# Patient Record
Sex: FEMALE | Race: BLACK OR AFRICAN AMERICAN | NOT HISPANIC OR LATINO | Employment: FULL TIME | URBAN - NONMETROPOLITAN AREA
[De-identification: names, ages, dates, MRNs, and addresses within clinical notes are randomized per-mention and may not be internally consistent; named-entity substitution may affect disease eponyms.]

---

## 2017-01-04 ENCOUNTER — NUTRITION (OUTPATIENT)
Dept: BARIATRICS/WEIGHT MGMT | Facility: HOSPITAL | Age: 32
End: 2017-01-04

## 2017-01-04 VITALS
RESPIRATION RATE: 20 BRPM | BODY MASS INDEX: 45.99 KG/M2 | HEIGHT: 67 IN | TEMPERATURE: 99.2 F | WEIGHT: 293 LBS | SYSTOLIC BLOOD PRESSURE: 157 MMHG | DIASTOLIC BLOOD PRESSURE: 92 MMHG

## 2017-01-04 PROCEDURE — 97802 MEDICAL NUTRITION INDIV IN: CPT

## 2017-02-06 ENCOUNTER — NUTRITION (OUTPATIENT)
Dept: BARIATRICS/WEIGHT MGMT | Facility: HOSPITAL | Age: 32
End: 2017-02-06

## 2017-02-06 ENCOUNTER — APPOINTMENT (OUTPATIENT)
Dept: BARIATRICS/WEIGHT MGMT | Facility: HOSPITAL | Age: 32
End: 2017-02-06

## 2017-02-06 ENCOUNTER — OFFICE VISIT (OUTPATIENT)
Dept: BARIATRICS/WEIGHT MGMT | Facility: CLINIC | Age: 32
End: 2017-02-06

## 2017-02-06 VITALS
TEMPERATURE: 98.2 F | DIASTOLIC BLOOD PRESSURE: 108 MMHG | BODY MASS INDEX: 45.99 KG/M2 | WEIGHT: 293 LBS | RESPIRATION RATE: 20 BRPM | OXYGEN SATURATION: 99 % | HEART RATE: 78 BPM | SYSTOLIC BLOOD PRESSURE: 163 MMHG | HEIGHT: 67 IN

## 2017-02-06 DIAGNOSIS — I10 HYPERTENSION, UNCONTROLLED: ICD-10-CM

## 2017-02-06 DIAGNOSIS — R63.5 WEIGHT GAIN: ICD-10-CM

## 2017-02-06 DIAGNOSIS — R53.83 FATIGUE, UNSPECIFIED TYPE: ICD-10-CM

## 2017-02-06 DIAGNOSIS — E66.01 MORBID OBESITY DUE TO EXCESS CALORIES (HCC): Primary | ICD-10-CM

## 2017-02-06 PROCEDURE — 99214 OFFICE O/P EST MOD 30 MIN: CPT | Performed by: NURSE PRACTITIONER

## 2017-02-06 PROCEDURE — 97803 MED NUTRITION INDIV SUBSEQ: CPT

## 2017-02-06 NOTE — PROGRESS NOTES
"Subjective   Samantha Vazquez is a 31 y.o. female.     History of Present Illness Samantha Vazquez is a 31 y.o. year-old who has morbid obesity and is interested in having surgical weight loss. Patient has been overweight for \"as long as I can remember\". The most weight ever lost was 20 pounds from using: going through a stressful relationship break up.  Unsupervised diet attempts include: low fat, low carb, Slim Fast, Atkins, etc.. Supervised diet attempts include: none (never had money to do those). Patient has tried the following OTC or prescription medications for weight loss: \"all kinds\" including Hydroxcut, green coffee beans, etc.  Patient states she tends to eat because she is hungry and admits to being an emotional eater. Patient is limited in activities due to: knee pain. She saw the dietician last month. She has been going to the gym three times per week on the treadmill. She has been getting at least 64 ounces of water per day. She is getting 3 meals per day with protein at each.     She  has a past medical history of Back pain; Chest pain on exertion; Heartburn; Hypertension; Insomnia; Irregular heart beat; Knee pain; Migraines; Night sweats; No energy; Swollen ankles; Tired; Urine frequency; and Weight gain.  She  does not have a problem list on file.  She  has a past surgical history that includes  section, classic and Excision Mass Trunk.  Her family history includes Cancer in her maternal grandfather and mother; Diabetes in her maternal grandmother; Hypertension in her mother; No Known Problems in her brother.  She  reports that she has never smoked. She has never used smokeless tobacco. She reports that she does not drink alcohol or use illicit drugs.  Current Outpatient Prescriptions   Medication Sig Dispense Refill   • fluticasone (VERAMYST) 27.5 MCG/SPRAY nasal spray 2 sprays into each nostril Daily.       No current facility-administered medications for this visit.      She has No Known " Allergies.      The following portions of the patient's history were reviewed and updated as appropriate: allergies, current medications, past family history, past medical history, past social history, past surgical history and problem list.    Review of Systems   Constitutional: Negative for activity change and appetite change.   HENT: Negative.    Eyes: Negative.    Respiratory: Negative.  Negative for apnea, cough, chest tightness and shortness of breath.          says that she does NOT snore   Cardiovascular: Positive for palpitations and leg swelling. Negative for chest pain.        Hx of HTN (elevated today) not currently tx; has appt with PCP today   Gastrointestinal: Negative.  Negative for abdominal pain, anal bleeding, constipation, nausea and vomiting.   Endocrine: Negative.    Genitourinary: Negative.    Musculoskeletal: Positive for arthralgias. Negative for back pain.        Knee pain   Skin: Negative.    Allergic/Immunologic: Negative.    Neurological: Negative.  Negative for seizures and syncope.   Hematological: Negative.  Does not bruise/bleed easily.   Psychiatric/Behavioral: Negative.  Negative for dysphoric mood, self-injury and sleep disturbance.       Objective   Physical Exam   Constitutional: She is oriented to person, place, and time. Vital signs are normal. She appears well-developed and well-nourished. She is cooperative. No distress.   HENT:   Head: Normocephalic and atraumatic.   Nose: Nose normal.   Mouth/Throat: Oropharynx is clear and moist. No oropharyngeal exudate or tonsillar abscesses.   Eyes: Conjunctivae, EOM and lids are normal. Pupils are equal, round, and reactive to light. Right eye exhibits no discharge. Left eye exhibits no discharge.   Neck: Trachea normal. Neck supple. No JVD present. Carotid bruit is not present. No rigidity. No tracheal deviation present. No thyromegaly present.   Cardiovascular: Normal rate, regular rhythm, S1 normal, S2 normal and normal  heart sounds.    Pulmonary/Chest: Effort normal and breath sounds normal. No stridor. No respiratory distress. She has no wheezes. She has no rales.   Abdominal: Soft. Bowel sounds are normal. She exhibits no distension. There is no tenderness.   Obese; healed scars noted    Musculoskeletal: She exhibits no edema.        Right shoulder: She exhibits normal strength.   Lymphadenopathy:     She has no cervical adenopathy.   Neurological: She is alert and oriented to person, place, and time. She has normal strength. No cranial nerve deficit.   Skin: Skin is warm, dry and intact. No rash noted.   Psychiatric: She has a normal mood and affect. Her speech is normal and behavior is normal.   Alert and oriented x 3   Vitals reviewed.      Assessment/Plan   Samantha was seen today for obesity, follow-up, nutrition counseling and weight loss.    Diagnoses and all orders for this visit:    Morbid obesity due to excess calories  -     Bariatric Nutritional Counseling; Standing  -     CBC (No Diff)  -     Comprehensive Metabolic Panel  -     Folate  -     Iron  -     Magnesium  -     Phosphorus  -     TSH  -     Vitamin A  -     Vitamin B1, Whole Blood  -     Vitamin B12  -     Vitamin D 25 Hydroxy  -     Vitamin E  -     Vitamin K1  -     Zinc  -     Lipid Panel    Body mass index 60.0-69.9, adult  -     Bariatric Nutritional Counseling; Standing  -     CBC (No Diff)  -     Comprehensive Metabolic Panel  -     Folate  -     Iron  -     Magnesium  -     Phosphorus  -     TSH  -     Vitamin A  -     Vitamin B1, Whole Blood  -     Vitamin B12  -     Vitamin D 25 Hydroxy  -     Vitamin E  -     Vitamin K1  -     Zinc  -     Lipid Panel    Hypertension, uncontrolled  -     Bariatric Nutritional Counseling; Standing  -     CBC (No Diff)  -     Comprehensive Metabolic Panel  -     Folate  -     Iron  -     Magnesium  -     Phosphorus  -     TSH  -     Vitamin A  -     Vitamin B1, Whole Blood  -     Vitamin B12  -     Vitamin D 25  Hydroxy  -     Vitamin E  -     Vitamin K1  -     Zinc  -     Lipid Panel    Fatigue, unspecified type  -     CBC (No Diff)  -     Comprehensive Metabolic Panel  -     Folate  -     Iron  -     Magnesium  -     Phosphorus  -     TSH  -     Vitamin A  -     Vitamin B1, Whole Blood  -     Vitamin B12  -     Vitamin D 25 Hydroxy  -     Vitamin E  -     Vitamin K1  -     Zinc  -     Lipid Panel    Weight gain  -     CBC (No Diff)  -     Comprehensive Metabolic Panel  -     Folate  -     Iron  -     Magnesium  -     Phosphorus  -     TSH  -     Vitamin A  -     Vitamin B1, Whole Blood  -     Vitamin B12  -     Vitamin D 25 Hydroxy  -     Vitamin E  -     Vitamin K1  -     Zinc  -     Lipid Panel        Samantha Vazquez is a  31 y.o. who has morbid obesity with BMI of 62.68 and desires surgical weight loss. Patient has the following comorbidities: HTN and knee pain.  Patient has been advised that this surgery is considered to be elective major surgery that is typically done laparoscopically. Patient is a potentially good surgical candidate. Patient will need to meet with the Bariatric Surgeon to further discuss surgical options. Patient has been advised that they will need to have a work-up prior to surgery. This work-up will include an EGD to assess for H. Pylori and Suarez's esophagus. Additionally, patient will need to have a psychological evaluation and clearance prior to surgery. Patient will need the following additional clearances: cardiac. Get mammogram done (mother has hx of breast CA). Baseline lab work will be obtained today. Keep appt with PCP today to get B/P addressed. Patient will see the dietician today for make specific goals for diet, exercise, and lifestyle. Patient has received intensive behavioral therapy for obesity today. I will have them follow up in one month for a weight recheck and to further discuss options.

## 2017-02-06 NOTE — PATIENT INSTRUCTIONS
Samantha Vazquez is a  31 y.o. who has morbid obesity with BMI of 62.68 and desires surgical weight loss. Patient has the following comorbidities: HTN and knee pain.  Patient has been advised that this surgery is considered to be elective major surgery that is typically done laparoscopically. Patient is a potentially good surgical candidate. Patient will need to meet with the Bariatric Surgeon to further discuss surgical options. Patient has been advised that they will need to have a work-up prior to surgery. This work-up will include an EGD to assess for H. Pylori and Suarez's esophagus. Additionally, patient will need to have a psychological evaluation and clearance prior to surgery. Patient will need the following additional clearances: cardiac. Get mammogram done (mother has hx of breast CA). Baseline lab work will be obtained today. Keep appt with PCP today to get B/P addressed. Patient will see the dietician today for make specific goals for diet, exercise, and lifestyle. Patient has received intensive behavioral therapy for obesity today. I will have them follow up in one month for a weight recheck and to further discuss options.

## 2017-02-06 NOTE — PROGRESS NOTES
"Nutrition Bariatric/MWL Note     Visit   BA# 2    Anthropometrics   Height: 67\"  Weight: 400#  BMI: 62.7  Gained: 1#    Nutrition Recall  24 Hour recall:  (B) protein shake (L) fast food,Hi-C fruit drink (D) meat, vegetables, water  Eating 3 meals daily   Limited sweet intake  Large portions  Drinking with meals   Drinking greater to or equal to 64 fluid ounces  Replacing 1 meal with protein shake daily     Exercise   Walking on treadmill: 30 minutes 3 times per week    Habits:  None  Alcohol occasionally    Education    \"Scaling back: How to manage Your Portion Sizes and Reading Food Labels\"    Nutrition Goals   Continue diet changes  Eat 3-4meals per day with protein  Eat protein first at meals  Portion control / Use smaller plate or measuring cup   Increase fluid intake to 64 ounces per day    Exercise Goals  Increase to 30 minutes of walking, cycling, elliptical, swimming, chair, yoga or crossfit daily    Veronica Davidson RD, LD  02/06/2017  11:57 AM    "

## 2017-02-13 ENCOUNTER — APPOINTMENT (OUTPATIENT)
Dept: LAB | Facility: HOSPITAL | Age: 32
End: 2017-02-13

## 2017-02-13 DIAGNOSIS — E55.9 VITAMIN D DEFICIENCY: Primary | ICD-10-CM

## 2017-02-13 LAB
25(OH)D3 SERPL-MCNC: <12.8 NG/ML (ref 30–100)
ALBUMIN SERPL-MCNC: 3.8 G/DL (ref 3.5–5)
ALBUMIN/GLOB SERPL: 1.2 G/DL (ref 1.1–2.5)
ALP SERPL-CCNC: 73 U/L (ref 24–120)
ALT SERPL W P-5'-P-CCNC: 41 U/L (ref 0–54)
ANION GAP SERPL CALCULATED.3IONS-SCNC: 5 MMOL/L (ref 4–13)
ARTICHOKE IGE QN: 88 MG/DL (ref 0–99)
AST SERPL-CCNC: 20 U/L (ref 7–45)
BILIRUB SERPL-MCNC: 0.4 MG/DL (ref 0.1–1)
BUN BLD-MCNC: 14 MG/DL (ref 5–21)
BUN/CREAT SERPL: 22.6 (ref 7–25)
CALCIUM SPEC-SCNC: 9.2 MG/DL (ref 8.4–10.4)
CHLORIDE SERPL-SCNC: 104 MMOL/L (ref 98–110)
CHOLEST SERPL-MCNC: 142 MG/DL (ref 130–200)
CO2 SERPL-SCNC: 29 MMOL/L (ref 24–31)
CREAT BLD-MCNC: 0.62 MG/DL (ref 0.5–1.4)
DEPRECATED RDW RBC AUTO: 42 FL (ref 40–54)
ERYTHROCYTE [DISTWIDTH] IN BLOOD BY AUTOMATED COUNT: 13.8 % (ref 12–15)
FOLATE SERPL-MCNC: 6.93 NG/ML
GFR SERPL CREATININE-BSD FRML MDRD: 136 ML/MIN/1.73
GLOBULIN UR ELPH-MCNC: 3.3 GM/DL
GLUCOSE BLD-MCNC: 87 MG/DL (ref 70–100)
HCT VFR BLD AUTO: 36.3 % (ref 37–47)
HDLC SERPL-MCNC: 41 MG/DL
HGB BLD-MCNC: 12 G/DL (ref 12–16)
IRON 24H UR-MRATE: 48 MCG/DL (ref 42–180)
LDLC/HDLC SERPL: 2.26 {RATIO}
MAGNESIUM SERPL-MCNC: 1.8 MG/DL (ref 1.4–2.2)
MCH RBC QN AUTO: 27.8 PG (ref 28–32)
MCHC RBC AUTO-ENTMCNC: 33.1 G/DL (ref 33–36)
MCV RBC AUTO: 84 FL (ref 82–98)
PHOSPHATE SERPL-MCNC: 3.4 MG/DL (ref 2.5–4.5)
PLATELET # BLD AUTO: 248 10*3/MM3 (ref 130–400)
PMV BLD AUTO: 9.7 FL (ref 6–12)
POTASSIUM BLD-SCNC: 4 MMOL/L (ref 3.5–5.3)
PROT SERPL-MCNC: 7.1 G/DL (ref 6.3–8.7)
RBC # BLD AUTO: 4.32 10*6/MM3 (ref 4.2–5.4)
SODIUM BLD-SCNC: 138 MMOL/L (ref 135–145)
TRIGL SERPL-MCNC: 41 MG/DL (ref 0–149)
TSH SERPL DL<=0.05 MIU/L-ACNC: 2.03 MIU/ML (ref 0.47–4.68)
VIT B12 BLD-MCNC: 566 PG/ML (ref 239–931)
WBC NRBC COR # BLD: 3.8 10*3/MM3 (ref 4.8–10.8)

## 2017-02-13 PROCEDURE — 83540 ASSAY OF IRON: CPT | Performed by: NURSE PRACTITIONER

## 2017-02-13 PROCEDURE — 84446 ASSAY OF VITAMIN E: CPT | Performed by: NURSE PRACTITIONER

## 2017-02-13 PROCEDURE — 80050 GENERAL HEALTH PANEL: CPT | Performed by: NURSE PRACTITIONER

## 2017-02-13 PROCEDURE — 84630 ASSAY OF ZINC: CPT | Performed by: NURSE PRACTITIONER

## 2017-02-13 PROCEDURE — 36415 COLL VENOUS BLD VENIPUNCTURE: CPT | Performed by: NURSE PRACTITIONER

## 2017-02-13 PROCEDURE — 84590 ASSAY OF VITAMIN A: CPT | Performed by: NURSE PRACTITIONER

## 2017-02-13 PROCEDURE — 82607 VITAMIN B-12: CPT | Performed by: NURSE PRACTITIONER

## 2017-02-13 PROCEDURE — 82306 VITAMIN D 25 HYDROXY: CPT | Performed by: NURSE PRACTITIONER

## 2017-02-13 PROCEDURE — 84425 ASSAY OF VITAMIN B-1: CPT | Performed by: NURSE PRACTITIONER

## 2017-02-13 PROCEDURE — 84597 ASSAY OF VITAMIN K: CPT | Performed by: NURSE PRACTITIONER

## 2017-02-13 PROCEDURE — 83735 ASSAY OF MAGNESIUM: CPT | Performed by: NURSE PRACTITIONER

## 2017-02-13 PROCEDURE — 84100 ASSAY OF PHOSPHORUS: CPT | Performed by: NURSE PRACTITIONER

## 2017-02-13 PROCEDURE — 82746 ASSAY OF FOLIC ACID SERUM: CPT | Performed by: NURSE PRACTITIONER

## 2017-02-13 PROCEDURE — 80061 LIPID PANEL: CPT | Performed by: NURSE PRACTITIONER

## 2017-02-13 RX ORDER — ERGOCALCIFEROL 1.25 MG/1
50000 CAPSULE ORAL 2 TIMES WEEKLY
Qty: 8 CAPSULE | Refills: 3 | Status: SHIPPED | OUTPATIENT
Start: 2017-02-13 | End: 2017-06-15

## 2017-02-14 ENCOUNTER — TELEPHONE (OUTPATIENT)
Dept: BARIATRICS/WEIGHT MGMT | Facility: CLINIC | Age: 32
End: 2017-02-14

## 2017-02-14 NOTE — TELEPHONE ENCOUNTER
----- Message from VIRI Maloney sent at 2/13/2017 12:42 PM CST -----  White blood cell count is one point below normal. Please, send a copy of these lab results to her PCP, VIRI Valente for her review. She may want to repeat her WBC count in few weeks/months to see if it comes back to normal.   Additionally, her Vitamin D level is low at less than 12.8. I have sent script into her pharmacy at [facility].She is to take Vitamin D 50,000 units TWICE weekly for next 3 months. There are refills on this medication. I will recheck the level at that time.  Please, call and let patient know. Thanks!      Called and informed patient of lab results and directions for Vitamin D.  Jesi

## 2017-02-15 LAB
VIT B1 BLD-SCNC: 108.5 NMOL/L (ref 66.5–200)
ZINC SERPL-MCNC: 58 UG/DL (ref 56–134)

## 2017-02-16 LAB
A-TOCOPHEROL VIT E SERPL-MCNC: 7.1 MG/L (ref 5.3–16.8)
PHYTONADIONE SERPL-MCNC: 0.15 NG/ML (ref 0.13–1.88)
VIT A SERPL-MCNC: 22 UG/DL (ref 20–65)

## 2017-03-03 ENCOUNTER — RESULTS ENCOUNTER (OUTPATIENT)
Dept: BARIATRICS/WEIGHT MGMT | Facility: CLINIC | Age: 32
End: 2017-03-03

## 2017-03-03 DIAGNOSIS — E66.01 MORBID OBESITY DUE TO EXCESS CALORIES (HCC): ICD-10-CM

## 2017-03-03 DIAGNOSIS — I10 HYPERTENSION, UNCONTROLLED: ICD-10-CM

## 2017-03-08 ENCOUNTER — NUTRITION (OUTPATIENT)
Dept: BARIATRICS/WEIGHT MGMT | Facility: HOSPITAL | Age: 32
End: 2017-03-08

## 2017-03-08 ENCOUNTER — OFFICE VISIT (OUTPATIENT)
Dept: BARIATRICS/WEIGHT MGMT | Facility: CLINIC | Age: 32
End: 2017-03-08

## 2017-03-08 VITALS
OXYGEN SATURATION: 100 % | RESPIRATION RATE: 20 BRPM | HEIGHT: 67 IN | HEART RATE: 72 BPM | TEMPERATURE: 98.2 F | DIASTOLIC BLOOD PRESSURE: 82 MMHG | BODY MASS INDEX: 45.99 KG/M2 | SYSTOLIC BLOOD PRESSURE: 144 MMHG | WEIGHT: 293 LBS

## 2017-03-08 DIAGNOSIS — E66.01 MORBID OBESITY DUE TO EXCESS CALORIES (HCC): Primary | ICD-10-CM

## 2017-03-08 DIAGNOSIS — E55.9 VITAMIN D DEFICIENCY: ICD-10-CM

## 2017-03-08 PROCEDURE — 99213 OFFICE O/P EST LOW 20 MIN: CPT | Performed by: NURSE PRACTITIONER

## 2017-03-08 PROCEDURE — 97803 MED NUTRITION INDIV SUBSEQ: CPT

## 2017-03-08 NOTE — PROGRESS NOTES
"Nutrition Bariatric/MWL Note     Visit   BA# 3    Anthropometrics   Height: 67\"  Weight: 401#  BMI: 62.9  Gained: 1#    Nutrition Recall  24 Hour recall:  (B) protein shake (L) meat & vegetables or double cheeseburger, water or HiC (D) meat & vegetables, water, cranberry juice  Eating 3 meals daily   Meeting protein daily goal  Snacking in afternoon on chips or crackers  Making healthier choices  Limited sweet intake  Large portions  Drinking with meals   Drinking less than 64 fluid ounces  Replacing 1 meal with protein shake daily     Exercise   Gym: 3 times per week for 30-45 minutes    Habits:  None    Education    \"Lets Get Physical: Fitness 101\"    Nutrition Goals   Continue diet changes  Eat protein first at meals  Eliminate snacks  Portion control / Use smaller plate or measuring cup   Increase fluid intake to 64 ounces per day  Drink between meals only. Stop 30 minutes before and start 45 minutes after. Sip only with meals    Exercise Goals  Increase to 30 minutes of walking, cycling, elliptical, swimming, chair, yoga or crossfit daily    Veronica Davidson RD, LD  03/08/2017  12:07 PM    "

## 2017-03-08 NOTE — PROGRESS NOTES
Subjective   Samantha Vazquez is a 31 y.o. female.     History of Present Illness Samantha Vazquez is here with morbid obesity and desires to have surgery for weight loss. This is the patient's 3rd visit to the office.  Patient has not been cleared by psych as of yet. Patient has been exercising is going to the gym three times per week for 30-45 minutes.  Patient has been making health dietary choices and eating 3 meals per day with protein at each. Patient is drinking at least 64 ounces of water per day. Her Vitamin D level was very low at last visit and supplement was started. Will recheck that level in May. She needs to get her BMI down below 60 before seeing BA surgeon and discussing EGD and surgery options. She will need to have cardiac clearance prior to surgery. She has been advised to get mammogram updated soon. She did see her PCP regarding her HTN and they placed her on HCTZ.         The following portions of the patient's history were reviewed and updated as appropriate: allergies, current medications, past family history, past medical history, past social history, past surgical history and problem list.    Review of Systems   Constitutional: Negative for activity change, appetite change and fatigue.   HENT: Negative.    Eyes: Negative.    Respiratory: Negative.  Negative for apnea, cough, chest tightness and shortness of breath.    Cardiovascular: Negative.  Negative for chest pain, palpitations and leg swelling.   Gastrointestinal: Negative.  Negative for abdominal pain, anal bleeding, constipation, nausea and vomiting.   Endocrine: Negative.    Genitourinary: Negative.    Musculoskeletal: Negative.  Negative for arthralgias and back pain.   Skin: Negative.    Allergic/Immunologic: Negative.    Neurological: Negative.  Negative for seizures and syncope.   Hematological: Negative.  Does not bruise/bleed easily.   Psychiatric/Behavioral: Negative.  Negative for dysphoric mood, self-injury and sleep  disturbance.       Objective   Physical Exam   Constitutional: She is oriented to person, place, and time. Vital signs are normal. She appears well-developed and well-nourished. She is cooperative. No distress.   HENT:   Head: Normocephalic and atraumatic.   Nose: Nose normal.   Mouth/Throat: Oropharynx is clear and moist. No oropharyngeal exudate or tonsillar abscesses.   Eyes: Conjunctivae, EOM and lids are normal. Pupils are equal, round, and reactive to light. Right eye exhibits no discharge. Left eye exhibits no discharge.   Neck: Trachea normal. Neck supple. No JVD present. Carotid bruit is not present. No rigidity. No tracheal deviation present. No thyromegaly present.   Cardiovascular: Normal rate, regular rhythm, S1 normal, S2 normal and normal heart sounds.    Pulmonary/Chest: Effort normal and breath sounds normal. No stridor. No respiratory distress. She has no wheezes. She has no rales.   Abdominal: Soft. Bowel sounds are normal. She exhibits no distension. There is no tenderness.   obese   Musculoskeletal: She exhibits no edema.        Right shoulder: She exhibits normal strength.   Lymphadenopathy:     She has no cervical adenopathy.   Neurological: She is alert and oriented to person, place, and time. She has normal strength. No cranial nerve deficit.   Skin: Skin is warm, dry and intact. No rash noted.   Psychiatric: She has a normal mood and affect. Her speech is normal and behavior is normal.   Alert and oriented x 3   Vitals reviewed.      Assessment/Plan   Samantha was seen today for weight loss and obesity.    Diagnoses and all orders for this visit:    Morbid obesity due to excess calories    Body mass index 60.0-69.9, adult    Vitamin D deficiency          Samantha Vazquez has done okay this month with healthy changes. Patient has gained 1.6 pounds. Today we discussed healthy changes in lifestyle, diet, and exercise.   They will meet with the dietician today.   Intensive behavioral therapy  for obesity was done today.   Goals for this month are: 3 meals per day with protein at each; eat protein first, use smaller plate; exercise as advised per dietician.  Continue to take Vitamin D supplement. Will recheck level in May.  Will need to get BMI down to below 60 prior to meeting with BA surgeon and discussing EGD and surgery options.   Still needs psych clearance and cardiac clearance.  Get mammogram done.  Follow up in one month for a weight recheck.

## 2017-03-08 NOTE — PATIENT INSTRUCTIONS
Samantha Vazquez has done okay this month with healthy changes. Patient has gained 1.6 pounds. Today we discussed healthy changes in lifestyle, diet, and exercise.   They will meet with the dietician today.   Intensive behavioral therapy for obesity was done today.   Goals for this month are: 3 meals per day with protein at each; eat protein first, use smaller plate; exercise as advised per dietician.  Continue to take Vitamin D supplement. Will recheck level in May.  Will need to get BMI down to below 60 prior to meeting with BA surgeon and discussing EGD and surgery options.   Still needs psych clearance and cardiac clearance.  Get mammogram done.  Follow up in one month for a weight recheck.

## 2017-03-31 ENCOUNTER — RESULTS ENCOUNTER (OUTPATIENT)
Dept: BARIATRICS/WEIGHT MGMT | Facility: CLINIC | Age: 32
End: 2017-03-31

## 2017-03-31 DIAGNOSIS — I10 HYPERTENSION, UNCONTROLLED: ICD-10-CM

## 2017-03-31 DIAGNOSIS — E66.01 MORBID OBESITY DUE TO EXCESS CALORIES (HCC): ICD-10-CM

## 2017-04-05 ENCOUNTER — OFFICE VISIT (OUTPATIENT)
Dept: BARIATRICS/WEIGHT MGMT | Facility: CLINIC | Age: 32
End: 2017-04-05

## 2017-04-05 ENCOUNTER — NUTRITION (OUTPATIENT)
Dept: BARIATRICS/WEIGHT MGMT | Facility: HOSPITAL | Age: 32
End: 2017-04-05

## 2017-04-05 VITALS
HEART RATE: 77 BPM | HEIGHT: 67 IN | TEMPERATURE: 98.3 F | DIASTOLIC BLOOD PRESSURE: 75 MMHG | RESPIRATION RATE: 20 BRPM | SYSTOLIC BLOOD PRESSURE: 150 MMHG | WEIGHT: 293 LBS | BODY MASS INDEX: 45.99 KG/M2

## 2017-04-05 DIAGNOSIS — E55.9 VITAMIN D DEFICIENCY: ICD-10-CM

## 2017-04-05 DIAGNOSIS — E66.01 MORBID OBESITY DUE TO EXCESS CALORIES (HCC): Primary | ICD-10-CM

## 2017-04-05 PROCEDURE — 97803 MED NUTRITION INDIV SUBSEQ: CPT

## 2017-04-05 PROCEDURE — 99213 OFFICE O/P EST LOW 20 MIN: CPT | Performed by: NURSE PRACTITIONER

## 2017-04-05 NOTE — PATIENT INSTRUCTIONS
Samantha Vazquez has done well this month with healthy changes. Patient has lost 5.6 pounds. Today we discussed healthy changes in lifestyle, diet, and exercise.   They will meet with the dietician today.  Intensive behavioral therapy for obesity was done today.   Goals for this month are: 3 meals per day with protein at each; eat protein first, use smaller plate; exercise as advised.  You may download the [employer] babita to your smart phone and see if that is helpful to track protein intake, exercise, etc.   Will recheck Vitamin D level in May.  Need to get BMI below 60 in order to have EGD and cardiac clearance and meet with bariatric surgeon. Need to get weight at or below 381 pounds.   Keep psych appt for later this month.   Get mammogram done in next couple of months (mother history of breast cancer).   Follow up in one month for a weight recheck.

## 2017-04-05 NOTE — PROGRESS NOTES
Subjective   Samantha Vazquez is a 31 y.o. female.     History of Present Illness Samantha Vazquez is here with morbid obesity and desires to have surgery for weight loss. This is the patient's 4th visit to the office. Patient has not been cleared by psych as of yet. She has an appt for later this month.  Patient has been exercising is going to the gym every day for 30-45 minutes.  She has been walking, doing squats, and cardio. Patient has been making health dietary choices and eating 3 meals per day with protein at each.  Patient is drinking at least 80 ounces of water per day. Her Vitamin D level was very low two months ago and supplement was started. Will recheck that level in May. She needs to get her BMI down below 60 before seeing BA surgeon and discussing EGD and surgery options. She will need to have cardiac clearance prior to surgery. She has been advised to get mammogram updated soon. She has appt for that in May.        The following portions of the patient's history were reviewed and updated as appropriate: allergies, current medications, past family history, past medical history, past social history, past surgical history and problem list.    Review of Systems   Constitutional: Negative for activity change, appetite change and fatigue.   HENT: Negative.    Eyes: Negative.    Respiratory: Negative.  Negative for apnea, cough, chest tightness and shortness of breath.    Cardiovascular: Negative.  Negative for chest pain, palpitations and leg swelling.   Gastrointestinal: Negative.  Negative for abdominal pain, anal bleeding, constipation, nausea and vomiting.   Endocrine:        Night sweats    Genitourinary: Negative.    Musculoskeletal: Positive for arthralgias. Negative for back pain.        Knee pain    Skin: Negative.    Allergic/Immunologic: Negative.    Neurological: Negative.  Negative for seizures and syncope.   Hematological: Negative.  Does not bruise/bleed easily.   Psychiatric/Behavioral:  Negative.  Negative for dysphoric mood, self-injury and sleep disturbance.       Objective   Physical Exam   Constitutional: She is oriented to person, place, and time. Vital signs are normal. She appears well-developed and well-nourished. She is cooperative. No distress.   HENT:   Head: Normocephalic and atraumatic.   Nose: Nose normal.   Mouth/Throat: Oropharynx is clear and moist. No oropharyngeal exudate or tonsillar abscesses.   Eyes: Conjunctivae, EOM and lids are normal. Pupils are equal, round, and reactive to light. Right eye exhibits no discharge. Left eye exhibits no discharge.   Neck: Trachea normal. Neck supple. No JVD present. Carotid bruit is not present. No rigidity. No tracheal deviation present. No thyromegaly present.   Cardiovascular: Normal rate, regular rhythm, S1 normal, S2 normal and normal heart sounds.    Pulmonary/Chest: Effort normal and breath sounds normal. No stridor. No respiratory distress. She has no wheezes. She has no rales.   Abdominal: Soft. Bowel sounds are normal. She exhibits no distension. There is no tenderness.   obese   Musculoskeletal: She exhibits no edema.        Right shoulder: She exhibits normal strength.   Lymphadenopathy:     She has no cervical adenopathy.   Neurological: She is alert and oriented to person, place, and time. She has normal strength. No cranial nerve deficit.   Skin: Skin is warm, dry and intact. No rash noted.   Psychiatric: She has a normal mood and affect. Her speech is normal and behavior is normal.   Alert and oriented x 3   Vitals reviewed.      Assessment/Plan   Samantha was seen today for weight loss and obesity.    Diagnoses and all orders for this visit:    Morbid obesity due to excess calories    Body mass index 60.0-69.9, adult    Vitamin D deficiency          Samantha Vazquez has done well this month with healthy changes. Patient has lost 5.6 pounds. Today we discussed healthy changes in lifestyle, diet, and exercise.   They will meet  with the dietician today.  Intensive behavioral therapy for obesity was done today.   Goals for this month are: 3 meals per day with protein at each; eat protein first, use smaller plate; exercise as advised.  You may download the [employer] babita to your smart phone and see if that is helpful to track protein intake, exercise, etc.   Will recheck Vitamin D level in May.  Need to get BMI below 60 in order to have EGD and cardiac clearance and meet with bariatric surgeon. Need to get weight at or below 381 pounds.   Keep psych appt for later this month.   Get mammogram done in next couple of months (mother history of breast cancer).   Follow up in one month for a weight recheck.

## 2017-04-28 ENCOUNTER — RESULTS ENCOUNTER (OUTPATIENT)
Dept: BARIATRICS/WEIGHT MGMT | Facility: CLINIC | Age: 32
End: 2017-04-28

## 2017-04-28 DIAGNOSIS — I10 HYPERTENSION, UNCONTROLLED: ICD-10-CM

## 2017-04-28 DIAGNOSIS — E66.01 MORBID OBESITY DUE TO EXCESS CALORIES (HCC): ICD-10-CM

## 2017-05-24 ENCOUNTER — APPOINTMENT (OUTPATIENT)
Dept: LAB | Facility: HOSPITAL | Age: 32
End: 2017-05-24

## 2017-05-24 ENCOUNTER — OFFICE VISIT (OUTPATIENT)
Dept: BARIATRICS/WEIGHT MGMT | Facility: CLINIC | Age: 32
End: 2017-05-24

## 2017-05-24 VITALS
BODY MASS INDEX: 45.99 KG/M2 | DIASTOLIC BLOOD PRESSURE: 97 MMHG | HEIGHT: 67 IN | OXYGEN SATURATION: 100 % | SYSTOLIC BLOOD PRESSURE: 138 MMHG | WEIGHT: 293 LBS | HEART RATE: 73 BPM

## 2017-05-24 DIAGNOSIS — Z01.810 PREOP CARDIOVASCULAR EXAM: ICD-10-CM

## 2017-05-24 DIAGNOSIS — E55.9 VITAMIN D DEFICIENCY: ICD-10-CM

## 2017-05-24 DIAGNOSIS — I10 HYPERTENSION, UNCONTROLLED: ICD-10-CM

## 2017-05-24 DIAGNOSIS — E66.01 MORBID OBESITY DUE TO EXCESS CALORIES (HCC): Primary | ICD-10-CM

## 2017-05-24 LAB — 25(OH)D3 SERPL-MCNC: 33.3 NG/ML (ref 30–100)

## 2017-05-24 PROCEDURE — 36415 COLL VENOUS BLD VENIPUNCTURE: CPT | Performed by: NURSE PRACTITIONER

## 2017-05-24 PROCEDURE — 82306 VITAMIN D 25 HYDROXY: CPT | Performed by: NURSE PRACTITIONER

## 2017-05-24 PROCEDURE — 99213 OFFICE O/P EST LOW 20 MIN: CPT | Performed by: NURSE PRACTITIONER

## 2017-05-26 ENCOUNTER — RESULTS ENCOUNTER (OUTPATIENT)
Dept: BARIATRICS/WEIGHT MGMT | Facility: CLINIC | Age: 32
End: 2017-05-26

## 2017-05-26 DIAGNOSIS — I10 HYPERTENSION, UNCONTROLLED: ICD-10-CM

## 2017-05-26 DIAGNOSIS — E66.01 MORBID OBESITY DUE TO EXCESS CALORIES (HCC): ICD-10-CM

## 2017-06-15 ENCOUNTER — OFFICE VISIT (OUTPATIENT)
Dept: CARDIOLOGY | Facility: CLINIC | Age: 32
End: 2017-06-15

## 2017-06-15 VITALS
BODY MASS INDEX: 45.99 KG/M2 | WEIGHT: 293 LBS | DIASTOLIC BLOOD PRESSURE: 85 MMHG | HEIGHT: 67 IN | SYSTOLIC BLOOD PRESSURE: 150 MMHG | HEART RATE: 71 BPM

## 2017-06-15 DIAGNOSIS — I10 HYPERTENSION, UNCONTROLLED: Primary | ICD-10-CM

## 2017-06-15 DIAGNOSIS — Z01.810 PRE-OPERATIVE CARDIOVASCULAR EXAMINATION: ICD-10-CM

## 2017-06-15 DIAGNOSIS — E66.01 MORBID OBESITY DUE TO EXCESS CALORIES (HCC): ICD-10-CM

## 2017-06-15 PROCEDURE — 93000 ELECTROCARDIOGRAM COMPLETE: CPT | Performed by: INTERNAL MEDICINE

## 2017-06-15 PROCEDURE — 99214 OFFICE O/P EST MOD 30 MIN: CPT | Performed by: INTERNAL MEDICINE

## 2017-06-15 RX ORDER — HYDROCHLOROTHIAZIDE 25 MG/1
25 TABLET ORAL DAILY
Qty: 30 TABLET | Refills: 11 | Status: SHIPPED | OUTPATIENT
Start: 2017-06-15 | End: 2017-09-28 | Stop reason: HOSPADM

## 2017-06-15 NOTE — PROGRESS NOTES
Subjective:     Encounter Date: 06/15/2017      Patient ID: Samantha Vazquez is a 31 y.o. female.Who is referred here for preoperative evaluation prior to gastric sleeve surgery.    Referring Provider: VIRI Cortes    Reason for Referral: Pre-operative risk assessment    Chief Complaint: Preoperative evaluation    Hypertension   This is a chronic problem. The problem is unchanged. The problem is uncontrolled. Associated symptoms include palpitations (occasional) and shortness of breath (described as very mild). Pertinent negatives include no anxiety, blurred vision, chest pain, headaches, malaise/fatigue, neck pain, orthopnea, peripheral edema or PND. There are no associated agents to hypertension. Risk factors for coronary artery disease include obesity. Past treatments include diuretics. The current treatment provides moderate improvement. There are no compliance problems.  There is no history of angina, kidney disease, CAD/MI, CVA, heart failure, left ventricular hypertrophy, PVD or retinopathy. There is no history of chronic renal disease.     This is a 31-year-old  female with a history of hypertension who is referred for preoperative risk assessment prior to upcoming gastric sleeve surgery.  In talking with the patient, she has never had any form of cardiovascular disease diagnosed 3 she does not have diabetes.  She has never had a stroke.  She has never been diagnosed with congestive heart failure.  She says that in general, her exercise tolerance is quite good.  She would easily be undergo up at least one flight of steps without any significant problems.  She denies any chest pain.  She has very mild shortness of breath and dyspnea on exertion with what she describes as moderate activity.  She has occasional palpitations but no prolonged periods of palpitations.  She denies lightheadedness, dizziness, syncope, orthopnea, PND, edema.  Her blood pressure is generally somewhat elevated  on blood pressure readings at physician's offices however she does not routinely check her blood pressure at home.    The following portions of the patient's history were reviewed and updated as appropriate: allergies, current medications, past family history, past medical history, past social history, past surgical history and problem list.     Past Medical History:   Diagnosis Date   • Back pain    • Chest pain on exertion    • Heartburn    • Hypertension     does not currently have any medication   • Insomnia    • Irregular heart beat    • Knee pain    • Migraines    • Night sweats     onset a long time per patient    • No energy    • Swollen ankles     occasionally    • Tired    • Urine frequency     Urgency/Frequency   • Weight gain      Past Surgical History:   Procedure Laterality Date   •  SECTION      ,    • EXCISION MASS TRUNK      benign       Current Outpatient Prescriptions:   •  fluticasone (VERAMYST) 27.5 MCG/SPRAY nasal spray, 2 sprays into each nostril Daily., Disp: , Rfl:   •  Pediatric Multivitamins-Iron (MULTIPLE VITAMINS-IRON PO), Take 2 tablet/day by mouth Daily., Disp: , Rfl:   •  hydrochlorothiazide (HYDRODIURIL) 25 MG tablet, Take 1 tablet by mouth Daily., Disp: 30 tablet, Rfl: 11    No Known Allergies    Social History   Substance Use Topics   • Smoking status: Never Smoker   • Smokeless tobacco: Never Used   • Alcohol use No     Family History   Problem Relation Age of Onset   • Cancer Mother      Breast   • Hypertension Mother    • Cancer Maternal Grandfather    • No Known Problems Brother    • Diabetes Maternal Grandmother      Review of Systems   Constitution: Negative for chills, fever, weakness, malaise/fatigue, night sweats and weight loss.   HENT: Negative for congestion, headaches, hearing loss and sore throat.    Eyes: Negative for blurred vision and pain.   Cardiovascular: Positive for palpitations (occasional). Negative for chest pain, claudication, irregular  heartbeat, leg swelling, orthopnea, paroxysmal nocturnal dyspnea and syncope.   Respiratory: Positive for shortness of breath (described as very mild). Negative for cough, hemoptysis and wheezing.    Endocrine: Negative for cold intolerance, heat intolerance, polydipsia and polyuria.   Hematologic/Lymphatic: Negative for adenopathy and bleeding problem. Does not bruise/bleed easily.   Skin: Negative for color change, poor wound healing and rash.   Musculoskeletal: Negative for arthritis, back pain, joint pain, joint swelling, myalgias and neck pain.   Gastrointestinal: Negative for abdominal pain, change in bowel habit, constipation, diarrhea, heartburn, hematemesis, hematochezia, melena, nausea and vomiting.   Genitourinary: Negative for bladder incontinence, dysuria, frequency, hematuria and nocturia.   Neurological: Negative for dizziness, focal weakness, light-headedness, loss of balance, numbness, paresthesias and seizures.   Psychiatric/Behavioral: Negative for altered mental status, memory loss and substance abuse.   Allergic/Immunologic: Negative for HIV exposure, hives and persistent infections.       ECG 12 Lead  Date/Time: 6/15/2017 8:57 AM  Performed by: MICHELLE GONZALEZ  Authorized by: MICHELLE GONZALEZ   Previous ECG: no previous ECG available  Rhythm: sinus rhythm  Rate: normal  BPM: 71  Conduction: 1st degree  QRS axis: normal  Clinical impression: non-specific ECG             Objective:     Physical Exam   Constitutional: She is oriented to person, place, and time. Vital signs are normal. She appears well-developed and well-nourished. She is cooperative.  Non-toxic appearance. No distress.   HENT:   Head: Normocephalic and atraumatic.   Right Ear: External ear normal.   Left Ear: External ear normal.   Nose: Nose normal.   Mouth/Throat: Uvula is midline, oropharynx is clear and moist and mucous membranes are normal. Mucous membranes are not pale, not dry and not cyanotic. No oropharyngeal  "exudate.   Eyes: EOM and lids are normal. Pupils are equal, round, and reactive to light.   Neck: Normal range of motion. Neck supple. No hepatojugular reflux and no JVD present. Carotid bruit is not present. No tracheal deviation and no edema present. No thyroid mass and no thyromegaly present.   Cardiovascular: Normal rate, regular rhythm, S1 normal, S2 normal, normal heart sounds, intact distal pulses and normal pulses.   No extrasystoles are present. PMI is not displaced.  Exam reveals no gallop and no friction rub.    No murmur heard.  Pulses:       Radial pulses are 2+ on the right side, and 2+ on the left side.        Femoral pulses are 2+ on the right side, and 2+ on the left side.       Dorsalis pedis pulses are 2+ on the right side, and 2+ on the left side.        Posterior tibial pulses are 2+ on the right side, and 2+ on the left side.   Pulmonary/Chest: Effort normal and breath sounds normal. No accessory muscle usage. No respiratory distress. She has no wheezes. She has no rales. She exhibits no tenderness.   Abdominal: Soft. Normal appearance and bowel sounds are normal. She exhibits no distension, no abdominal bruit and no pulsatile midline mass. There is no hepatosplenomegaly. There is no tenderness.   Musculoskeletal: Normal range of motion. She exhibits no edema, tenderness or deformity.   Lymphadenopathy:     She has no cervical adenopathy.   Neurological: She is oriented to person, place, and time. She has normal strength. No cranial nerve deficit.   Skin: Skin is warm, dry and intact. No rash noted. She is not diaphoretic. No cyanosis or erythema. Nails show no clubbing.   Psychiatric: She has a normal mood and affect. Her speech is normal and behavior is normal. Thought content normal.   Vitals reviewed.    /85 (BP Location: Left arm, Patient Position: Sitting)  Pulse 71  Ht 67\" (170.2 cm)  Wt (!) 385 lb (175 kg)  BMI 60.3 kg/m2         Assessment:          Diagnosis Plan   1. " Hypertension, uncontrolled  hydrochlorothiazide (HYDRODIURIL) 25 MG tablet    ECG 12 Lead   2. Pre-operative cardiovascular examination     3. Morbid obesity due to excess calories          Plan:       1.  Preoperative cardiovascular examination: The patient is to undergo intermediate risk surgery, gastric sleeve surgery.  Based on the Revised Cardiac Risk Index, the patient has no clinical risk factors.  The patient's estimated functional capacity is greater than 4 metabolic equivalents.  At this level of activity, the patient does not have any significant symptoms.  Therefore, given this patient's risk profile and current clinical stability with good functional capacity, I do not feel that any further preoperative cardiovascular testing is indicated at this time, as any such testing would not necessarily lower this patient's operative risk.    2.  Essential hypertension: The patient's blood pressure is elevated today and prior readings indicate that her blood pressure typically runs close to this level.  She is on 12.5 mg of hydrochlorothiazide daily.  I will increase this to 25 mg and a vascular follow-up with her primary care physician regarding her blood pressure.    3.  Morbid obesity due to excess calories: The patient says that she is trying to be more physically active and encouraged her to continue to do so.  It seems that at this time she has a good functional capacity therefore I have encouraged her to be as active as possible in an effort to achieve a healthier lifestyle.  In addition, she will be following up in the bariatrics clinic.    Follow-up: As needed    EMR Dragon/Transcription disclaimer: Much of this encounter note is an electronic transcription/translation of spoken language to printed text. The electronic translation of spoken language may permit erroneous, or at times, nonsensical words or phrases to be inadvertently transcribed; although I have reviewed the note for such errors, some may  still exist.

## 2017-06-22 ENCOUNTER — PREP FOR SURGERY (OUTPATIENT)
Dept: OTHER | Facility: HOSPITAL | Age: 32
End: 2017-06-22

## 2017-06-22 ENCOUNTER — OFFICE VISIT (OUTPATIENT)
Dept: BARIATRICS/WEIGHT MGMT | Facility: CLINIC | Age: 32
End: 2017-06-22

## 2017-06-22 VITALS
OXYGEN SATURATION: 100 % | TEMPERATURE: 97.8 F | HEART RATE: 72 BPM | DIASTOLIC BLOOD PRESSURE: 86 MMHG | SYSTOLIC BLOOD PRESSURE: 133 MMHG | HEIGHT: 66 IN | BODY MASS INDEX: 47.09 KG/M2 | WEIGHT: 293 LBS

## 2017-06-22 DIAGNOSIS — I10 HYPERTENSION, UNCONTROLLED: ICD-10-CM

## 2017-06-22 DIAGNOSIS — E66.01 MORBID OBESITY DUE TO EXCESS CALORIES (HCC): ICD-10-CM

## 2017-06-22 PROCEDURE — 99213 OFFICE O/P EST LOW 20 MIN: CPT | Performed by: SURGERY

## 2017-06-22 NOTE — PROGRESS NOTES
Patient Care Team:  VIRI Valente as PCP - General (Family Medicine)  VIRI Maloney as Referring Physician (Family Medicine)  Juan M Garrison MD as Cardiologist (Cardiology)    Reason for Visit:  Surgical Weight loss    Subjective     Patient is a 31 y.o. female presents with morbid obesity and her Body mass index is 62.11 kg/(m^2)..     She is here for discussion of surgical weight loss options.  She stated she has been with the disease of obesity for year(s)(11).  She stated she suffers from , fatigue, knee and back pain due to her weight gain.  She stated that weight loss helps alleviate these symptoms.   She stated that she has tried multiple weight loss regimens including participating in a medically supervised weight loss program to help with weight loss.  She stated that she has attempted these conservative methods for weight loss without maintaining long term success.  Today she would like to discuss surgical weight loss options such as the Laparoscopic Sleeve Gastrectomy or the Laparoscopic R - Y Gastric Bypass.     Review of Systems  General ROS: positive for  - fatigue, sleep disturbance and weight gain  Ophthalmic ROS: negative  Respiratory ROS: no cough, shortness of breath, or wheezing  Cardiovascular ROS: no chest pain or dyspnea on exertion  Gastrointestinal ROS: no abdominal pain, change in bowel habits, or black or bloody stools  negative for - gas/bloating, heartburn or nausea/vomiting  Musculoskeletal ROS: positive for - joint pain and pain in back - lower  Neurological ROS: no TIA or stroke symptoms    History  Past Medical History:   Diagnosis Date   • Back pain    • Chest pain on exertion    • Heartburn    • Hypertension     does not currently have any medication   • Insomnia    • Irregular heart beat    • Knee pain    • Migraines    • Night sweats     onset a long time per patient    • No energy    • Swollen ankles     occasionally    • Tired    • Urine frequency      Urgency/Frequency   • Weight gain      Past Surgical History:   Procedure Laterality Date   •  SECTION      ,    • EXCISION MASS TRUNK      benign     Family History   Problem Relation Age of Onset   • Cancer Mother      Breast   • Hypertension Mother    • Cancer Maternal Grandfather    • No Known Problems Brother    • Diabetes Maternal Grandmother      Social History   Substance Use Topics   • Smoking status: Never Smoker   • Smokeless tobacco: Never Used   • Alcohol use No       (Not in a hospital admission)  Allergies:  Review of patient's allergies indicates no known allergies.    Objective     Vital Signs  Temp:  [97.8 °F (36.6 °C)] 97.8 °F (36.6 °C)  Heart Rate:  [72] 72  BP: (133)/(86) 133/86  Body mass index is 62.11 kg/(m^2).  Last 3 weights    17  0903   Weight: (!) 384 lb 12.8 oz (175 kg)       Physical Exam:     HEENT: extra ocular movement intact  Respiratory: appears well, vitals normal, no respiratory distress, acyanotic, normal RR, chest clear, no wheezing, crepitations, rhonchi, normal symmetric air entry  Cardiovascular: Regular rate and rhythm, S1, S2 normal, no murmur, click, rub or gallop  GI: Soft, non-tender, normal bowel sounds; no bruits, organomegaly or masses.  Abnormal shape: obese  Musculoskeletal: range of motion normal  Neurologic: alert, oriented, normal speech, no focal findings or movement disorder noted       Results Review:   None        Assessment/Plan   Encounter Diagnoses   Name Primary?   • Body mass index 60.0-69.9, adult Yes   • Morbid obesity due to excess calories    • Hypertension, uncontrolled        1.  I believe this patient will be a good candidate for weight loss surgery.  I have discussed the Viet - Y Gastric Bypass, laparoscopic sleeve gastrectomy and the Laparoscopic Gastric Band procedures.  We discussed the benefits of the surgeries including the benefit of weight loss and the possible reversal of co-morbid conditions associated with morbid  obesity. I explained to the patient that prior to making a definitive decision on the type of surgery she will require an esophagogastroduodenoscopy.  The alternatives  include not doing anything, or pursuing an UGI series which only offers a diagnosis with potential less accuracy compared to EGD. The benefits of the EGD such as identifying the pathology and anatomy of the upper GI system and the complications and risks of the procedure.  The risk of the endoscopy were discussed in detail. We discussed the risk of perforation (one out of 4838-6896, riskier with dilation), bleeding (one out of 500), and the rare risks of infection, adverse reaction to anesthesia, respiratory failure, cardiac failure including MI and adverse reaction to medications, etc. We discussed consequences that could occur if a risk were to develop such as the need for hospitalization, blood transfusion, surgical intervention, medications, pain, disability and death. The patient verbalizes understanding and agrees to proceed. such as bleeding, perforation, swallowing difficulties and gas bloat can occur after this procedure.  Upon completion of our discussion and addressing and answering her questions to her satisfation, informed consent was obtained.  She will be scheduled accordingly for the esophagogastroduodenoscopy procedure.  I also discussed with the patient that she will need to continue to lose weight prior to her procedure.  She was explained we require that her body mass index is below 60 prior to having her surgical weight loss procedure.  She will be started on a modified diet regimen today to help facilitate continued weight loss.  She will return in 1 month's time to assess her continued weight loss progress and scheduled for her EGD in approximately 6 weeks from.      I discussed the patients findings and my recommendations with patient.     Dr. Home Bahena MD [facility]    06/22/17  10:15 AM  Patient Care Team:  Renetta Cazares  APRN as PCP - General (Family Medicine)  VIRI Maloney as Referring Physician (Family Medicine)  Juan M Garrison MD as Cardiologist (Cardiology)

## 2017-06-23 ENCOUNTER — RESULTS ENCOUNTER (OUTPATIENT)
Dept: BARIATRICS/WEIGHT MGMT | Facility: CLINIC | Age: 32
End: 2017-06-23

## 2017-06-23 DIAGNOSIS — I10 HYPERTENSION, UNCONTROLLED: ICD-10-CM

## 2017-06-23 DIAGNOSIS — E66.01 MORBID OBESITY DUE TO EXCESS CALORIES (HCC): ICD-10-CM

## 2017-07-20 ENCOUNTER — OFFICE VISIT (OUTPATIENT)
Dept: BARIATRICS/WEIGHT MGMT | Facility: CLINIC | Age: 32
End: 2017-07-20

## 2017-07-20 VITALS
WEIGHT: 293 LBS | DIASTOLIC BLOOD PRESSURE: 83 MMHG | BODY MASS INDEX: 47.09 KG/M2 | OXYGEN SATURATION: 100 % | HEIGHT: 66 IN | SYSTOLIC BLOOD PRESSURE: 133 MMHG | HEART RATE: 68 BPM

## 2017-07-20 DIAGNOSIS — Z79.899 ENCOUNTER FOR LONG-TERM (CURRENT) USE OF MEDICATIONS: ICD-10-CM

## 2017-07-20 DIAGNOSIS — E66.01 MORBID OBESITY WITH BMI OF 60.0-69.9, ADULT (HCC): Primary | ICD-10-CM

## 2017-07-20 PROCEDURE — 99213 OFFICE O/P EST LOW 20 MIN: CPT | Performed by: NURSE PRACTITIONER

## 2017-07-20 NOTE — PROGRESS NOTES
"Subjective   Samantha Vazquez is a 32 y.o. female.     History of Present Illness   Samantha Vazquez is here with morbid obesity and desires to have surgery for weight loss. This is the patient's 7th visit to the office.  Patient has been cleared by psychologist and cardiologist.  Patient has been exercising by cardio and walking 4 times per week and for 45 minutes. Patient has been making health dietary choices and eating 4 meals per day with protein at each. Patient is unsure of how many protein grams she is getting per day. Patient is drinking 60-80 ounces of water per day. She has seen Dr. Bahena, but her BMI changed last month when she was re-measured to above 60 BMI. She needs to lose down to BMI of 60 or below prior to having EGD done on August 8th. She will be started on Metformin today to help facilitate weight loss. She has a normal BUN/Creat on file from February of this year.   Vitals:    07/20/17 0927   BP: 133/83   BP Location: Right arm   Patient Position: Sitting   Cuff Size: Adult   Pulse: 68   SpO2: 100%   Weight: (!) 380 lb 3.2 oz (172 kg)   Height: 66\" (167.6 cm)         The following portions of the patient's history were reviewed and updated as appropriate: allergies, current medications, past family history, past medical history, past social history, past surgical history and problem list.    Review of Systems   Constitutional: Negative for activity change, appetite change and fatigue.   HENT: Negative.    Eyes: Negative.    Respiratory: Negative.  Negative for apnea, cough, chest tightness and shortness of breath.    Cardiovascular: Negative.  Negative for chest pain, palpitations and leg swelling.   Gastrointestinal: Negative.  Negative for abdominal pain, anal bleeding, constipation, nausea and vomiting.   Endocrine: Negative.    Genitourinary: Negative.    Musculoskeletal: Negative.  Negative for arthralgias and back pain.   Skin: Negative.    Allergic/Immunologic: Negative.  "   Neurological: Negative.  Negative for seizures and syncope.   Hematological: Negative.  Does not bruise/bleed easily.   Psychiatric/Behavioral: Negative.  Negative for dysphoric mood, self-injury and sleep disturbance.       Objective   Physical Exam   Constitutional: She is oriented to person, place, and time. Vital signs are normal. She appears well-developed and well-nourished. She is cooperative. No distress.   HENT:   Head: Normocephalic and atraumatic.   Nose: Nose normal.   Mouth/Throat: Oropharynx is clear and moist. No oropharyngeal exudate or tonsillar abscesses.   Eyes: Conjunctivae, EOM and lids are normal. Pupils are equal, round, and reactive to light. Right eye exhibits no discharge. Left eye exhibits no discharge.   Neck: Trachea normal. Neck supple. No JVD present. Carotid bruit is not present. No rigidity. No tracheal deviation present. No thyromegaly present.   Cardiovascular: Normal rate, regular rhythm, S1 normal, S2 normal and normal heart sounds.    Pulmonary/Chest: Effort normal and breath sounds normal. No stridor. No respiratory distress. She has no wheezes. She has no rales.   Abdominal: Soft. Bowel sounds are normal. She exhibits no distension. There is no tenderness.   obese   Musculoskeletal: She exhibits no edema.        Right shoulder: She exhibits normal strength.   Lymphadenopathy:     She has no cervical adenopathy.   Neurological: She is alert and oriented to person, place, and time. She has normal strength. No cranial nerve deficit.   Skin: Skin is warm, dry and intact. No rash noted.   Psychiatric: She has a normal mood and affect. Her speech is normal and behavior is normal.   Alert and oriented x 3   Vitals reviewed.      Assessment/Plan   Samantha was seen today for follow-up, obesity, nutrition counseling and weight loss.    Diagnoses and all orders for this visit:    Morbid obesity with BMI of 60.0-69.9, adult    Encounter for long-term (current) use of  medications    Other orders  -     metFORMIN (GLUCOPHAGE) 500 MG tablet; Take 1 tablet by mouth 2 (Two) Times a Day With Meals.          Samantha Vazquez has done well this month with healthy changes. Patient has lost 4.6 pounds. Today we discussed healthy changes in lifestyle, diet, and exercise.   Extended liver shrinking diet (already has info).  Premier shake sample provided may use as meal replacement. Stretch band provided.  Intensive behavioral therapy for obesity was done today.   Goals for this month are: lose 7 pounds prior to EGD; eat protein first, use smaller plate; exercise as advised (swimming, launch pad videos, chair dancing, stretch bands).   Start Metformin to help facilitate weight loss. BUN and creat were normal in February.   Follow up on 8/7/17 for weight recheck prior to EGD on 8/8/17 with Dr. Bahena. BMI needs to be at 60 or below.

## 2017-07-20 NOTE — PATIENT INSTRUCTIONS
Samantha Vazquez has done well this month with healthy changes. Patient has lost 4.6 pounds. Today we discussed healthy changes in lifestyle, diet, and exercise.   Extended liver shrinking diet (already has info).  Premier shake sample provided may use as meal replacement. Stretch band provided.  Intensive behavioral therapy for obesity was done today.   Goals for this month are: lose 7 pounds prior to EGD; eat protein first, use smaller plate; exercise as advised (swimming, launch pad videos, chair dancing, stretch bands).   Start Metformin to help facilitate weight loss. BUN and creat were normal in February.   Follow up on 8/7/17 for weight recheck prior to EGD on 8/8/17 with Dr. Bahena. BMI needs to be at 60 or below.

## 2017-07-21 ENCOUNTER — RESULTS ENCOUNTER (OUTPATIENT)
Dept: BARIATRICS/WEIGHT MGMT | Facility: CLINIC | Age: 32
End: 2017-07-21

## 2017-07-21 DIAGNOSIS — E66.01 MORBID OBESITY DUE TO EXCESS CALORIES (HCC): ICD-10-CM

## 2017-07-21 DIAGNOSIS — I10 HYPERTENSION, UNCONTROLLED: ICD-10-CM

## 2017-07-31 ENCOUNTER — TREATMENT (OUTPATIENT)
Dept: BARIATRICS/WEIGHT MGMT | Facility: CLINIC | Age: 32
End: 2017-07-31

## 2017-07-31 VITALS — BODY MASS INDEX: 47.09 KG/M2 | HEIGHT: 66 IN | WEIGHT: 293 LBS

## 2017-08-07 ENCOUNTER — OFFICE VISIT (OUTPATIENT)
Dept: BARIATRICS/WEIGHT MGMT | Facility: CLINIC | Age: 32
End: 2017-08-07

## 2017-08-07 VITALS
OXYGEN SATURATION: 100 % | TEMPERATURE: 98.6 F | BODY MASS INDEX: 47.09 KG/M2 | DIASTOLIC BLOOD PRESSURE: 86 MMHG | HEIGHT: 66 IN | SYSTOLIC BLOOD PRESSURE: 151 MMHG | WEIGHT: 293 LBS | HEART RATE: 73 BPM

## 2017-08-07 PROCEDURE — 99212 OFFICE O/P EST SF 10 MIN: CPT | Performed by: NURSE PRACTITIONER

## 2017-08-07 NOTE — PROGRESS NOTES
"Subjective   Samantha Vazquez is a 32 y.o. female.     History of Present Illness Samantha Vazquez is here with morbid obesity and desires to have surgery for weight loss. This is the patient's 8th visit to the office.  Patient has been cleared by psychologist and cardiologist.  Patient has been exercising by cardio and walking 4 times per week and for 60 minutes. Patient has been making health dietary choices and eating 3-4 meals per day with protein at each. Patient is getting at least 20 grams of protein per day. Patient is drinking 74 ounces of water per day. She has seen Dr. Bahena, but her BMI changed last month when she was re-measured to above 60 BMI. She needs to lose down to BMI of 60 or below prior to having EGD done on August 8th. Today her BMI is 60.85 and Dr. Bahena has given the approval to proceed with EGD tomorrow. She was started on Metformin last visit to help facilitate weight loss. This medication seems to be helping and is being well tolerated.     Vitals:    08/07/17 1303   BP: 151/86   BP Location: Right arm   Patient Position: Sitting   Cuff Size: Adult   Pulse: 73   Temp: 98.6 °F (37 °C)   SpO2: 100%   Weight: (!) 377 lb (171 kg)   Height: 66\" (167.6 cm)       The following portions of the patient's history were reviewed and updated as appropriate: allergies, current medications, past family history, past medical history, past social history, past surgical history and problem list.    Review of Systems   Constitutional: Negative for activity change, appetite change and fatigue.   HENT: Negative.    Eyes: Negative.    Respiratory: Negative.  Negative for apnea, cough, chest tightness and shortness of breath.    Cardiovascular: Negative.  Negative for chest pain, palpitations and leg swelling.   Gastrointestinal: Negative.  Negative for abdominal pain, anal bleeding, constipation, nausea and vomiting.   Endocrine: Negative.    Genitourinary: Negative.    Musculoskeletal: Negative.  Negative for " arthralgias and back pain.   Skin: Negative.    Allergic/Immunologic: Negative.    Neurological: Negative.  Negative for seizures and syncope.   Hematological: Negative.  Does not bruise/bleed easily.   Psychiatric/Behavioral: Negative.  Negative for dysphoric mood, self-injury and sleep disturbance.       Objective   Physical Exam   Constitutional: She is oriented to person, place, and time. Vital signs are normal. She appears well-developed and well-nourished. She is cooperative. No distress.   HENT:   Head: Normocephalic and atraumatic.   Nose: Nose normal.   Mouth/Throat: Oropharynx is clear and moist. No oropharyngeal exudate or tonsillar abscesses.   Eyes: Conjunctivae, EOM and lids are normal. Pupils are equal, round, and reactive to light. Right eye exhibits no discharge. Left eye exhibits no discharge.   Neck: Trachea normal. Neck supple. No JVD present. Carotid bruit is not present. No rigidity. No tracheal deviation present. No thyromegaly present.   Cardiovascular: Normal rate, regular rhythm, S1 normal, S2 normal and normal heart sounds.    Pulmonary/Chest: Effort normal and breath sounds normal. No stridor. No respiratory distress. She has no wheezes. She has no rales.   Abdominal: Soft. Bowel sounds are normal. She exhibits no distension. There is no tenderness.   obese   Musculoskeletal: She exhibits no edema.        Right shoulder: She exhibits normal strength.   Lymphadenopathy:     She has no cervical adenopathy.   Neurological: She is alert and oriented to person, place, and time. She has normal strength. No cranial nerve deficit.   Skin: Skin is warm, dry and intact. No rash noted.   Psychiatric: She has a normal mood and affect. Her speech is normal and behavior is normal.   Alert and oriented x 3   Vitals reviewed.      Assessment/Plan   Samantha was seen today for follow-up, obesity and weight loss.    Diagnoses and all orders for this visit:    Body mass index 60.0-69.9, adult               Samantha Vazquez has done well this month with healthy changes. Patient has lost 3 pounds. Today we discussed healthy changes in lifestyle, diet, and exercise.   Intensive behavioral therapy for obesity was done today.   Goals for this month are: 3 meals per day with protein at each; eat protein first, use smaller plate; exercise as advised.  Continue taking Metformin as directed.  Keep EGD scheduled for tomorrow with Dr. Bahena.   Everything else has been completed per insurance requirements.  Follow up in two weeks for a weight recheck and if BMI less than 60 she can then see Dr. Bahena at his earliest appt for surgery submission.

## 2017-08-07 NOTE — PATIENT INSTRUCTIONS
Samantha Vazquez has done well this month with healthy changes. Patient has lost 3 pounds. Today we discussed healthy changes in lifestyle, diet, and exercise.   Intensive behavioral therapy for obesity was done today.   Goals for this month are: 3 meals per day with protein at each; eat protein first, use smaller plate; exercise as advised.  Continue taking Metformin as directed.  Keep EGD scheduled for tomorrow with Dr. Bahena.   Everything else has been completed per insurance requirements.  Follow up in two weeks for a weight recheck and if BMI less than 60 she can then see Dr. Bahena at his earliest appt for surgery submission.

## 2017-08-08 ENCOUNTER — HOSPITAL ENCOUNTER (OUTPATIENT)
Facility: HOSPITAL | Age: 32
Setting detail: HOSPITAL OUTPATIENT SURGERY
Discharge: HOME | End: 2017-08-08
Attending: SURGERY | Admitting: SURGERY

## 2017-08-08 ENCOUNTER — ANESTHESIA EVENT (OUTPATIENT)
Dept: GASTROENTEROLOGY | Facility: HOSPITAL | Age: 32
End: 2017-08-08

## 2017-08-08 ENCOUNTER — ANESTHESIA (OUTPATIENT)
Dept: GASTROENTEROLOGY | Facility: HOSPITAL | Age: 32
End: 2017-08-08

## 2017-08-08 VITALS
OXYGEN SATURATION: 100 % | BODY MASS INDEX: 48.82 KG/M2 | DIASTOLIC BLOOD PRESSURE: 72 MMHG | HEIGHT: 65 IN | SYSTOLIC BLOOD PRESSURE: 163 MMHG | WEIGHT: 293 LBS | HEART RATE: 67 BPM | RESPIRATION RATE: 18 BRPM | TEMPERATURE: 97.1 F

## 2017-08-08 PROBLEM — K44.9 HIATAL HERNIA: Status: ACTIVE | Noted: 2017-08-08

## 2017-08-08 LAB — B-HCG UR QL: NEGATIVE

## 2017-08-08 PROCEDURE — 87081 CULTURE SCREEN ONLY: CPT | Performed by: SURGERY

## 2017-08-08 PROCEDURE — 43239 EGD BIOPSY SINGLE/MULTIPLE: CPT | Performed by: SURGERY

## 2017-08-08 PROCEDURE — 81025 URINE PREGNANCY TEST: CPT | Performed by: SURGERY

## 2017-08-08 PROCEDURE — 25010000002 PROPOFOL 10 MG/ML EMULSION: Performed by: NURSE ANESTHETIST, CERTIFIED REGISTERED

## 2017-08-08 PROCEDURE — 88305 TISSUE EXAM BY PATHOLOGIST: CPT | Performed by: SURGERY

## 2017-08-08 RX ORDER — PANTOPRAZOLE SODIUM 40 MG/1
40 TABLET, DELAYED RELEASE ORAL DAILY
Qty: 30 TABLET | Refills: 1 | Status: SHIPPED | OUTPATIENT
Start: 2017-08-08

## 2017-08-08 RX ORDER — LIDOCAINE HYDROCHLORIDE 10 MG/ML
0.5 INJECTION, SOLUTION INFILTRATION; PERINEURAL ONCE AS NEEDED
Status: DISCONTINUED | OUTPATIENT
Start: 2017-08-08 | End: 2017-08-08 | Stop reason: SDUPTHER

## 2017-08-08 RX ORDER — SODIUM CHLORIDE 0.9 % (FLUSH) 0.9 %
3 SYRINGE (ML) INJECTION AS NEEDED
Status: DISCONTINUED | OUTPATIENT
Start: 2017-08-08 | End: 2017-08-08 | Stop reason: HOSPADM

## 2017-08-08 RX ORDER — SODIUM CHLORIDE 9 MG/ML
500 INJECTION, SOLUTION INTRAVENOUS CONTINUOUS PRN
Status: DISCONTINUED | OUTPATIENT
Start: 2017-08-08 | End: 2017-08-08 | Stop reason: HOSPADM

## 2017-08-08 RX ORDER — PROPOFOL 10 MG/ML
VIAL (ML) INTRAVENOUS AS NEEDED
Status: DISCONTINUED | OUTPATIENT
Start: 2017-08-08 | End: 2017-08-08 | Stop reason: SURG

## 2017-08-08 RX ORDER — LIDOCAINE HYDROCHLORIDE 20 MG/ML
INJECTION, SOLUTION INFILTRATION; PERINEURAL AS NEEDED
Status: DISCONTINUED | OUTPATIENT
Start: 2017-08-08 | End: 2017-08-08 | Stop reason: SURG

## 2017-08-08 RX ADMIN — LIDOCAINE HYDROCHLORIDE 100 MG: 20 INJECTION, SOLUTION INFILTRATION; PERINEURAL at 07:34

## 2017-08-08 RX ADMIN — LIDOCAINE HYDROCHLORIDE 100 MG: 20 INJECTION, SOLUTION INFILTRATION; PERINEURAL at 07:33

## 2017-08-08 RX ADMIN — SODIUM CHLORIDE 500 ML: 9 INJECTION, SOLUTION INTRAVENOUS at 07:14

## 2017-08-08 RX ADMIN — PROPOFOL 100 MG: 10 INJECTION, EMULSION INTRAVENOUS at 07:33

## 2017-08-08 RX ADMIN — PROPOFOL 100 MG: 10 INJECTION, EMULSION INTRAVENOUS at 07:34

## 2017-08-08 RX ADMIN — LIDOCAINE HYDROCHLORIDE 0.5 ML: 10 INJECTION, SOLUTION EPIDURAL; INFILTRATION; INTRACAUDAL; PERINEURAL at 07:14

## 2017-08-08 NOTE — ANESTHESIA PREPROCEDURE EVALUATION
Anesthesia Evaluation     Patient summary reviewed   no history of anesthetic complications:  NPO Solid Status: > 8 hours       Airway   Mallampati: III  TM distance: >3 FB  Neck ROM: full  Dental          Pulmonary    (-) asthma, sleep apnea, not a smoker  Cardiovascular   Exercise tolerance: good (4-7 METS)    (+) hypertension,   (-) pacemaker, past MI, angina, cardiac stents      Neuro/Psych  (-) seizures, CVA  GI/Hepatic/Renal/Endo    (+) morbid obesity,   (-) GERD, liver disease, no renal disease, diabetes    Musculoskeletal     Abdominal    Substance History      OB/GYN    (-)  Pregnant        Other                                        Anesthesia Plan    ASA 3     general     intravenous induction   Anesthetic plan and risks discussed with patient.

## 2017-08-08 NOTE — H&P
Patient Care Team:  VIRI Valente as PCP - General (Family Medicine)  VIRI Maloney as Referring Physician (Family Medicine)  Juan M Garrison MD as Cardiologist (Cardiology)    Reason for Visit:  Surgical Weight loss    Subjective     Patient is a 32 y.o. female presents with morbid obesity and her Body mass index is 62.9 kg/(m^2).  History of Present Illness Samantha Vazquez is here with morbid obesity and desires to have surgery for weight loss. This is the patient's 8th visit to the office.  Patient has been cleared by psychologist and cardiologist.  Patient has been exercising by cardio and walking 4 times per week and for 60 minutes. Patient has been making health dietary choices and eating 3-4 meals per day with protein at each. Patient is getting at least 20 grams of protein per day. Patient is drinking 74 ounces of water per day. She has seen Dr. Bahena, but her BMI changed last month when she was re-measured to above 60 BMI. She needs to lose down to BMI of 60 or below prior to having EGD done on August 8th. On 8/7/2017 her BMI was 60.85 and Dr. Bahena has given the approval to proceed with EGD tomorrow. She was started on Metformin last visit to help facilitate weight loss. This medication seems to be helping and is being well tolerated.       Review of Systems  General ROS: positive for  - weight gain  Respiratory ROS: no cough, shortness of breath, or wheezing  Cardiovascular ROS: no chest pain or dyspnea on exertion  Gastrointestinal ROS: no abdominal pain, change in bowel habits, or black or bloody stools  Neurological ROS: no TIA or stroke symptoms    History  Past Medical History:   Diagnosis Date   • Back pain    • Chest pain on exertion    • Heartburn    • Hypertension     does not currently have any medication   • Insomnia    • Irregular heart beat    • Knee pain    • Migraines    • Night sweats     onset a long time per patient    • No energy    • Swollen ankles      occasionally    • Tired    • Urine frequency     Urgency/Frequency   • Weight gain      Past Surgical History:   Procedure Laterality Date   •  SECTION      ,    • EXCISION MASS TRUNK      benign     Family History   Problem Relation Age of Onset   • Cancer Mother      Breast   • Hypertension Mother    • Cancer Maternal Grandfather    • No Known Problems Brother    • Diabetes Maternal Grandmother      Social History   Substance Use Topics   • Smoking status: Never Smoker   • Smokeless tobacco: Never Used   • Alcohol use No     Prescriptions Prior to Admission   Medication Sig Dispense Refill Last Dose   • fluticasone (VERAMYST) 27.5 MCG/SPRAY nasal spray 2 sprays into each nostril Daily.   Past Week at Unknown time   • metFORMIN (GLUCOPHAGE) 500 MG tablet Take 1 tablet by mouth 2 (Two) Times a Day With Meals. 60 tablet 1 2017 at 1800   • Pediatric Multivitamins-Iron (MULTIPLE VITAMINS-IRON PO) Take 2 tablet/day by mouth Daily.   2017 at 1000   • hydrochlorothiazide (HYDRODIURIL) 25 MG tablet Take 1 tablet by mouth Daily. 30 tablet 11 2017     Allergies:  Review of patient's allergies indicates no known allergies.    Objective     Vital Signs  Temp:  [97.1 °F (36.2 °C)-98.6 °F (37 °C)] 97.1 °F (36.2 °C)  Heart Rate:  [73-76] 76  Resp:  [20] 20  BP: (151-158)/(86-91) 158/91  Body mass index is 62.9 kg/(m^2).  Last 3 weights    17  0659   Weight: (!) 378 lb (171 kg)       Physical Exam:     HEENT: extra ocular movement intact  Respiratory: chest clear, no wheezing, crepitations, rhonchi, normal symmetric air entry  Cardiovascular: Regular rate and rhythm, S1, S2 normal, no murmur, click, rub or gallop  GI: Soft, non-tender, normal bowel sounds; no bruits, organomegaly or masses.  Abnormal shape: obese.     Results Review:   None        Assessment/Plan   Patient Active Problem List   Diagnosis   • Morbid obesity due to excess calories   • Body mass index 60.0-69.9, adult   •  Hypertension, uncontrolled   • Fatigue   • Weight gain   • Vitamin D deficiency   • Pre-operative cardiovascular examination     I believe this patient will be a good candidate for weight loss surgery.  I have discussed the Viet - Y Gastric Bypass, laparoscopic sleeve gastrectomy and the Laparoscopic Gastric Band procedures.  We discussed the benefits of the surgeries including the benefit of weight loss and the possible reversal of co-morbid conditions associated with morbid obesity. I explained to the patient that prior to making a definitive decision on the type of surgery she will require an esophagogastroduodenoscopy.  The alternatives  include not doing anything, or pursuing an UGI series which only offers a diagnosis with potential less accuracy compared to EGD. The benefits of the EGD such as identifying the pathology and anatomy of the upper GI system and the complications and risks of the procedure.  The risk of the endoscopy were discussed in detail. We discussed the risk of perforation (one out of 4977-8838, riskier with dilation), bleeding (one out of 500), and the rare risks of infection, adverse reaction to anesthesia, respiratory failure, cardiac failure including MI and adverse reaction to medications, etc. We discussed consequences that could occur if a risk were to develop such as the need for hospitalization, blood transfusion, surgical intervention, medications, pain, disability and death. The patient verbalizes understanding and agrees to proceed. such as bleeding, perforation, swallowing difficulties and gas bloat can occur after this procedure.  Upon completion of our discussion and addressing and answering her questions to her satisfation, informed consent was obtained.  She will be scheduled accordingly for the esophagogastroduodenoscopy procedure.  I discussed the patients findings and my recommendations with patient.     Dr. Home Bahena MD [facility]    08/08/17  7:13 AM  Patient Care  Team:  VIRI Valente as PCP - General (Family Medicine)  VIRI Maloney as Referring Physician (Family Medicine)  Juan M Garrison MD as Cardiologist (Cardiology)

## 2017-08-08 NOTE — ANESTHESIA POSTPROCEDURE EVALUATION
"Patient: Samantha Vazquez    Procedure Summary     Date Anesthesia Start Anesthesia Stop Room / Location    08/08/17 0729 0743  [facility] /  [facility]       Procedure Diagnosis Surgeon Provider    ESOPHAGOGASTRODUODENOSCOPY WITH ANESTHESIA (N/A Esophagus) BMI 60.0-69.9, adult; Gastritis; Hiatal hernia  (BMI 60.0-69.9, adult [Z68.44]) MD Sonido Birch CRNA          Anesthesia Type: general  Last vitals  BP   158/91 (08/08/17 0659)    Temp   97.1 °F (36.2 °C) (08/08/17 0659)    Pulse   76 (08/08/17 0659)   Resp   20 (08/08/17 0659)    SpO2   98 % (08/08/17 0659)      Post Anesthesia Care and Evaluation    Patient location during evaluation: PHASE II  Patient participation: complete - patient participated  Level of consciousness: awake  Pain score: 0  Pain management: adequate  Airway patency: patent  Anesthetic complications: No anesthetic complications  PONV Status: none  Cardiovascular status: acceptable and blood pressure returned to baseline  Respiratory status: acceptable  Hydration status: acceptable    Comments: Blood pressure 158/91, pulse 76, temperature 97.1 °F (36.2 °C), temperature source Temporal Artery , resp. rate 20, height 65\" (165.1 cm), weight (!) 378 lb (171 kg), last menstrual period 07/25/2017, SpO2 98 %.        "

## 2017-08-08 NOTE — PLAN OF CARE
Problem: Patient Care Overview (Adult)  Goal: Adult Individualization and Mutuality  Outcome: Ongoing (interventions implemented as appropriate)    08/08/17 0657   Individualization   Patient Specific Preferences none   Patient Specific Goals none   Patient Specific Interventions none   Mutuality/Individual Preferences   What Anxieties, Fears or Concerns Do You Have About Your Health or Care? none   What Questions Do You Have About Your Health or Care? none   What Information Would Help Us Give You More Personalized Care? none

## 2017-08-08 NOTE — PLAN OF CARE
Problem: Patient Care Overview (Adult)  Goal: Plan of Care Review  Outcome: Outcome(s) achieved Date Met:  08/08/17 08/08/17 0749   Coping/Psychosocial Response Interventions   Plan Of Care Reviewed With patient;spouse   Patient Care Overview   Progress progress toward functional goals as expected   Outcome Evaluation   Outcome Summary/Follow up Plan D/C CRITERIA MET

## 2017-08-08 NOTE — BRIEF OP NOTE
ESOPHAGOGASTRODUODENOSCOPY WITH ANESTHESIA  Procedure Note    Samantha Vazquez  8/8/2017    Pre-op Diagnosis:   BMI 60.0-69.9, adult [Z68.44]    Post-op Diagnosis:     Post-Op Diagnosis Codes:     * BMI 60.0-69.9, adult [Z68.44]     * Gastritis [K29.70]     * Hiatal hernia [K44.9]    Procedure/CPT® Codes:      Procedure(s):  ESOPHAGOGASTRODUODENOSCOPY WITH ANESTHESIA    Surgeon(s):  Home Bahena MD    Anesthesia: Monitor Anesthesia Care    Staff:   Endo Technician: Juliette Stevens  Endo Nurse: Mansi Rocha RN    Estimated Blood Loss: *No blood loss documented*  Urine Voided: * No values recorded between 8/8/2017  7:28 AM and 8/8/2017  7:42 AM *    Specimens:                  ID Type Source Tests Collected by Time Destination   1 : arvind Tissue Stomach UREASE FOR H PYLORI, 24 HR Home Bahena MD 8/8/2017 0739    A : bx @ ge[facility] Tissue [place] TISSUE EXAM Home Bahena MD 8/8/2017 0740            Findings: hiatal hernia simple small    Complications: none      Home Bahena MD     Date: 8/8/2017  Time: 7:53 AM

## 2017-08-08 NOTE — PLAN OF CARE
Problem: GI Endoscopy (Adult)  Goal: Signs and Symptoms of Listed Potential Problems Will be Absent or Manageable (GI Endoscopy)  Outcome: Ongoing (interventions implemented as appropriate)    Problem: Patient Care Overview (Adult)  Goal: Plan of Care Review  Outcome: Ongoing (interventions implemented as appropriate)    08/08/17 0740   Coping/Psychosocial Response Interventions   Plan Of Care Reviewed With patient   Patient Care Overview   Progress no change   Outcome Evaluation   Outcome Summary/Follow up Plan pt tolerated procedure well.

## 2017-08-08 NOTE — PLAN OF CARE
Problem: GI Endoscopy (Adult)  Goal: Signs and Symptoms of Listed Potential Problems Will be Absent or Manageable (GI Endoscopy)  Outcome: Outcome(s) achieved Date Met:  08/08/17

## 2017-08-09 LAB
CYTO UR: NORMAL
LAB AP CASE REPORT: NORMAL
LAB AP CLINICAL INFORMATION: NORMAL
Lab: NORMAL
PATH REPORT.FINAL DX SPEC: NORMAL
PATH REPORT.GROSS SPEC: NORMAL
UREASE TISS QL: NEGATIVE

## 2017-08-18 ENCOUNTER — RESULTS ENCOUNTER (OUTPATIENT)
Dept: BARIATRICS/WEIGHT MGMT | Facility: CLINIC | Age: 32
End: 2017-08-18

## 2017-08-18 DIAGNOSIS — E66.01 MORBID OBESITY DUE TO EXCESS CALORIES (HCC): ICD-10-CM

## 2017-08-18 DIAGNOSIS — I10 HYPERTENSION, UNCONTROLLED: ICD-10-CM

## 2017-08-21 ENCOUNTER — OFFICE VISIT (OUTPATIENT)
Dept: BARIATRICS/WEIGHT MGMT | Facility: CLINIC | Age: 32
End: 2017-08-21

## 2017-08-21 VITALS
HEIGHT: 66 IN | HEART RATE: 78 BPM | DIASTOLIC BLOOD PRESSURE: 86 MMHG | OXYGEN SATURATION: 99 % | SYSTOLIC BLOOD PRESSURE: 154 MMHG | BODY MASS INDEX: 47.09 KG/M2 | WEIGHT: 293 LBS

## 2017-08-21 DIAGNOSIS — E66.01 MORBID OBESITY WITH BMI OF 50.0-59.9, ADULT (HCC): Primary | ICD-10-CM

## 2017-08-21 PROCEDURE — 99212 OFFICE O/P EST SF 10 MIN: CPT | Performed by: NURSE PRACTITIONER

## 2017-08-21 NOTE — PROGRESS NOTES
"Subjective   Samantha Vazquez is a 32 y.o. female.     History of Present Illness   Samantha Vazquez is here with morbid obesity and desires to have surgery for weight loss. This is the patient's 9th visit to the office.  Patient has been cleared by psychologist and cardiologist. She had her EGD done with Dr. Bahena on 8/8/17 and path report was negative for Suarez's esophagus. She is still doing fine on the metformin that she was prescribed by this provider. She has met her BMI requirement of getting below 60 and is now ready to see Dr. Bahena for surgery submission. Patient has been exercising by doing cardio and stretching for 30 minutes daily.  Patient has been making health dietary choices and eating 3 meals per day with protein at each. She is getting 72 grams of protein per day. Patient is drinking 64 ounces of water per day.   Vitals:    08/21/17 1107   BP: 154/86   BP Location: Right arm   Patient Position: Sitting   Cuff Size: Adult   Pulse: 78   SpO2: 99%   Weight: (!) 370 lb 6.4 oz (168 kg)   Height: 66\" (167.6 cm)         The following portions of the patient's history were reviewed and updated as appropriate: allergies, current medications, past family history, past medical history, past social history, past surgical history and problem list.    Review of Systems   Constitutional: Negative for activity change, appetite change and fatigue.   HENT: Negative.    Eyes: Negative.    Respiratory: Negative.  Negative for apnea, cough, chest tightness and shortness of breath.    Cardiovascular: Negative.  Negative for chest pain, palpitations and leg swelling.   Gastrointestinal: Negative.  Negative for abdominal pain, anal bleeding, constipation, nausea and vomiting.   Endocrine: Negative.    Genitourinary: Negative.    Musculoskeletal: Negative.  Negative for arthralgias and back pain.   Skin: Negative.    Allergic/Immunologic: Negative.    Neurological: Negative.  Negative for seizures and syncope. "   Hematological: Negative.  Does not bruise/bleed easily.   Psychiatric/Behavioral: Negative.  Negative for dysphoric mood, self-injury and sleep disturbance.       Objective   Physical Exam   Constitutional: She is oriented to person, place, and time. Vital signs are normal. She appears well-developed and well-nourished. She is cooperative. No distress.   HENT:   Head: Normocephalic and atraumatic.   Nose: Nose normal.   Mouth/Throat: Oropharynx is clear and moist. No oropharyngeal exudate or tonsillar abscesses.   Eyes: Conjunctivae, EOM and lids are normal. Pupils are equal, round, and reactive to light. Right eye exhibits no discharge. Left eye exhibits no discharge.   Neck: Trachea normal. Neck supple. No JVD present. Carotid bruit is not present. No rigidity. No tracheal deviation present. No thyromegaly present.   Cardiovascular: Normal rate, regular rhythm, S1 normal, S2 normal and normal heart sounds.    Pulmonary/Chest: Effort normal and breath sounds normal. No stridor. No respiratory distress. She has no wheezes. She has no rales.   Abdominal: Soft. Bowel sounds are normal. She exhibits no distension. There is no tenderness.   obese   Musculoskeletal: She exhibits no edema.        Right shoulder: She exhibits normal strength.   Lymphadenopathy:     She has no cervical adenopathy.   Neurological: She is alert and oriented to person, place, and time. She has normal strength. No cranial nerve deficit.   Skin: Skin is warm, dry and intact. No rash noted.   Psychiatric: She has a normal mood and affect. Her speech is normal and behavior is normal.   Alert and oriented x 3   Vitals reviewed.      Assessment/Plan   Samantha was seen today for follow-up, obesity, nutrition counseling and weight loss.    Diagnoses and all orders for this visit:    Morbid obesity with BMI of 50.0-59.9, adult          Samantha Vazquez has done really well with healthy changes. Patient has lost 7 pounds.   Today we discussed healthy  changes in lifestyle, diet, and exercise.   Intensive behavioral therapy for obesity was done today.   Goals for this month are: 3 meals per day with protein at each; eat protein first, use smaller plate; exercise as advised.  Continue Metformin for now. Will need to stop 7 days before surgery.   Follow up with Dr. Bahena for first available appt for surgery submission as all requirements have been met.

## 2017-08-21 NOTE — PATIENT INSTRUCTIONS
Samantha Vazquez has done really well with healthy changes. Patient has lost 7 pounds.   Today we discussed healthy changes in lifestyle, diet, and exercise.   Intensive behavioral therapy for obesity was done today.   Goals for this month are: 3 meals per day with protein at each; eat protein first, use smaller plate; exercise as advised.  Continue Metformin for now. Will need to stop 7 days before surgery.   Follow up with Dr. Bahena for first available appt for surgery submission as all requirements have been met.

## 2017-08-29 ENCOUNTER — OFFICE VISIT (OUTPATIENT)
Dept: BARIATRICS/WEIGHT MGMT | Facility: CLINIC | Age: 32
End: 2017-08-29

## 2017-08-29 VITALS
DIASTOLIC BLOOD PRESSURE: 87 MMHG | SYSTOLIC BLOOD PRESSURE: 160 MMHG | HEART RATE: 74 BPM | OXYGEN SATURATION: 100 % | TEMPERATURE: 96.8 F | WEIGHT: 293 LBS | HEIGHT: 66 IN | BODY MASS INDEX: 47.09 KG/M2

## 2017-08-29 DIAGNOSIS — E66.9 SUPER OBESE: ICD-10-CM

## 2017-08-29 DIAGNOSIS — K44.9 HIATAL HERNIA: ICD-10-CM

## 2017-08-29 PROCEDURE — 99213 OFFICE O/P EST LOW 20 MIN: CPT | Performed by: SURGERY

## 2017-08-29 RX ORDER — SCOLOPAMINE TRANSDERMAL SYSTEM 1 MG/1
1 PATCH, EXTENDED RELEASE TRANSDERMAL ONCE
Status: CANCELLED | OUTPATIENT
Start: 2017-08-29 | End: 2017-08-29

## 2017-08-29 NOTE — PROGRESS NOTES
Patient Care Team:  VIRI Valente as PCP - General (Family Medicine)  VIRI Maloney as Referring Physician (Family Medicine)  Juan M Garrison MD as Cardiologist (Cardiology)    Reason for Visit:  Surgical Weight loss    Subjective     Patient is a 32 y.o. female presents with morbid obesity and her Body mass index is 59.17 kg/(m^2)..     She is here for discussion of surgical weight loss options.  She stated she has been with the disease of obesity for year(s).  She stated she suffers from hypertension and insulin resistance due to her weight gain.  She stated that weight loss helps alleviate these symptoms.   She stated that she has tried multiple diet regimens including, participating in a medically supervised weight loss program to help with weight loss.  She stated that she has attempted these conservative methods for weight loss without maintaining long term success.  Today she would like to discuss surgical weight loss options such as the Laparoscopic Sleeve Gastrectomy or the Laparoscopic R - Y Gastric Bypass.     Review of Systems  General ROS: negative  Psychological ROS: negative  Ophthalmic ROS: negative  Respiratory ROS: no cough, shortness of breath, or wheezing  Cardiovascular ROS: no chest pain or dyspnea on exertion  Gastrointestinal ROS: no abdominal pain, change in bowel habits, or black or bloody stools  Musculoskeletal ROS: negative  Neurological ROS: no TIA or stroke symptoms    History  Past Medical History:   Diagnosis Date   • Back pain    • Chest pain on exertion    • Heartburn    • Hypertension     does not currently have any medication   • Insomnia    • Irregular heart beat    • Knee pain    • Migraines    • Night sweats     onset a long time per patient    • No energy    • Swollen ankles     occasionally    • Tired    • Urine frequency     Urgency/Frequency   • Weight gain      Past Surgical History:   Procedure Laterality Date   •  SECTION      2008    • ENDOSCOPY N/A 8/8/2017    Procedure: ESOPHAGOGASTRODUODENOSCOPY WITH ANESTHESIA;  Surgeon: Home Bahena MD;  Location: [facility] ENDOSCOPY;  Service:    • EXCISION MASS TRUNK      benign     Family History   Problem Relation Age of Onset   • Cancer Mother      Breast   • Hypertension Mother    • Cancer Maternal Grandfather    • No Known Problems Brother    • Diabetes Maternal Grandmother      Social History   Substance Use Topics   • Smoking status: Never Smoker   • Smokeless tobacco: Never Used   • Alcohol use No       (Not in a hospital admission)  Allergies:  Review of patient's allergies indicates no known allergies.    Objective     Vital Signs  Temp:  [96.8 °F (36 °C)] 96.8 °F (36 °C)  Heart Rate:  [74] 74  BP: (160)/(87) 160/87  Body mass index is 59.17 kg/(m^2).  Last 3 weights    08/29/17  1318   Weight: (!) 366 lb 9.6 oz (166 kg)       Physical Exam:     HEENT: PERRLA and Thyroid without masses  Respiratory: appears well, vitals normal, no respiratory distress, acyanotic, normal RR, neck free of mass or lymphadenopathy, chest clear, no wheezing, crepitations, rhonchi, normal symmetric air entry  Cardiovascular: Regular rate and rhythm, S1, S2 normal, no murmur, click, rub or gallop  GI: Soft, non-tender, normal bowel sounds; no bruits, organomegaly or masses.  Abnormal shape: obese  Musculoskeletal: inspection - no abnormality  Neurologic: alert, oriented, normal speech, no focal findings or movement disorder noted       Results Review:   None        Assessment/Plan   Encounter Diagnoses   Name Primary?   • Body mass index (BMI) of 50-59.9 in adult Yes   • Super obese    • Hiatal hernia        1.  I believe this patient will be a good candidate for weight loss surgery.    She has chosen laparoscopic sleeve gastrectomy. I agree with this decision.  I have discussed the Viet - Y Gastric Bypass, laparoscopic sleeve gastrectomy and the Laparoscopic Gastric Band procedures to provide the alternatives which  includes non surgical weight loss options as well.  We discussed the benefits of the surgeries including the benefit of weight loss and the possible reversal of co-morbid conditions associated with morbid obesity.  We discussed the complications and risks which include the risk of perforation, leakage,bleeding, intra-abdominal organ injury, stenosis or ulcerations, the risk of deep vein thrombosis formation leading to possible pulmonary embolisms, the risk of death.  I explained to the patient the possibility that this procedure may not be performed laparoscopically and may require being converted to an opened procedure or aborted due to abnormal anatomy.  Also postoperatively there is a risk of increased GERD symptoms after this procedure.  Upon completion of our discussion and addressing and answering her questions to her satisfation, informed consent was obtained.     We also discussed the EGD findings of a small hiatal hernia.  I explained to the patient that this hiatal hernia defect will be repaired based off the findings intraoperatively.  As a result there could be an increased risks of esophageal, diaphragmatic or liver injuries.  There is a possibility that the hiatal hernia may recur as well.  She will be scheduled accordingly for a laparoscopic Sleeve gastrectomy procedure.      I discussed the patients findings and my recommendations with patient.     Dr. Home Bahena MD [facility]    08/29/17  2:21 PM  Patient Care Team:  VIRI Valente as PCP - General (Family Medicine)  VIRI Maloney as Referring Physician (Family Medicine)  Juan M Garrison MD as Cardiologist (Cardiology)

## 2017-09-08 ENCOUNTER — APPOINTMENT (OUTPATIENT)
Dept: LAB | Facility: HOSPITAL | Age: 32
End: 2017-09-08
Attending: SURGERY

## 2017-09-08 ENCOUNTER — TREATMENT (OUTPATIENT)
Dept: BARIATRICS/WEIGHT MGMT | Facility: CLINIC | Age: 32
End: 2017-09-08

## 2017-09-08 ENCOUNTER — APPOINTMENT (OUTPATIENT)
Dept: PREADMISSION TESTING | Facility: HOSPITAL | Age: 32
End: 2017-09-08

## 2017-09-08 VITALS — BODY MASS INDEX: 47.09 KG/M2 | WEIGHT: 293 LBS | HEIGHT: 66 IN

## 2017-09-08 LAB
ALBUMIN SERPL-MCNC: 4.2 G/DL (ref 3.5–5)
ALBUMIN/GLOB SERPL: 1.2 G/DL (ref 1.1–2.5)
ALP SERPL-CCNC: 74 U/L (ref 24–120)
ALT SERPL W P-5'-P-CCNC: 23 U/L (ref 0–54)
ANION GAP SERPL CALCULATED.3IONS-SCNC: 9 MMOL/L (ref 4–13)
APTT PPP: 34.2 SECONDS (ref 24.1–34.8)
AST SERPL-CCNC: 50 U/L (ref 7–45)
BASOPHILS # BLD AUTO: 0.01 10*3/MM3 (ref 0–0.2)
BASOPHILS NFR BLD AUTO: 0.2 % (ref 0–2)
BILIRUB SERPL-MCNC: 1 MG/DL (ref 0.1–1)
BUN BLD-MCNC: 12 MG/DL (ref 5–21)
BUN/CREAT SERPL: 21.8 (ref 7–25)
CALCIUM SPEC-SCNC: 9.2 MG/DL (ref 8.4–10.4)
CHLORIDE SERPL-SCNC: 107 MMOL/L (ref 98–110)
CO2 SERPL-SCNC: 22 MMOL/L (ref 24–31)
CREAT BLD-MCNC: 0.55 MG/DL (ref 0.5–1.4)
DEPRECATED RDW RBC AUTO: 42.8 FL (ref 40–54)
EOSINOPHIL # BLD AUTO: 0.02 10*3/MM3 (ref 0–0.7)
EOSINOPHIL NFR BLD AUTO: 0.4 % (ref 0–4)
ERYTHROCYTE [DISTWIDTH] IN BLOOD BY AUTOMATED COUNT: 13.9 % (ref 12–15)
GFR SERPL CREATININE-BSD FRML MDRD: >150 ML/MIN/1.73
GLOBULIN UR ELPH-MCNC: 3.5 GM/DL
GLUCOSE BLD-MCNC: 76 MG/DL (ref 70–100)
HCT VFR BLD AUTO: 38.6 % (ref 37–47)
HGB BLD-MCNC: 13 G/DL (ref 12–16)
IMM GRANULOCYTES # BLD: 0.01 10*3/MM3 (ref 0–0.03)
IMM GRANULOCYTES NFR BLD: 0.2 % (ref 0–5)
INR PPP: 0.96 (ref 0.91–1.09)
LYMPHOCYTES # BLD AUTO: 1.61 10*3/MM3 (ref 0.72–4.86)
LYMPHOCYTES NFR BLD AUTO: 31.9 % (ref 15–45)
MCH RBC QN AUTO: 28.3 PG (ref 28–32)
MCHC RBC AUTO-ENTMCNC: 33.7 G/DL (ref 33–36)
MCV RBC AUTO: 84.1 FL (ref 82–98)
MONOCYTES # BLD AUTO: 0.37 10*3/MM3 (ref 0.19–1.3)
MONOCYTES NFR BLD AUTO: 7.3 % (ref 4–12)
NEUTROPHILS # BLD AUTO: 3.03 10*3/MM3 (ref 1.87–8.4)
NEUTROPHILS NFR BLD AUTO: 60 % (ref 39–78)
NRBC BLD MANUAL-RTO: 0 /100 WBC (ref 0–0)
PLATELET # BLD AUTO: 276 10*3/MM3 (ref 130–400)
PMV BLD AUTO: 10.5 FL (ref 6–12)
POTASSIUM BLD-SCNC: 3.9 MMOL/L (ref 3.5–5.3)
PROT SERPL-MCNC: 7.7 G/DL (ref 6.3–8.7)
PROTHROMBIN TIME: 13.1 SECONDS (ref 11.9–14.6)
RBC # BLD AUTO: 4.59 10*6/MM3 (ref 4.2–5.4)
SODIUM BLD-SCNC: 138 MMOL/L (ref 135–145)
WBC NRBC COR # BLD: 5.05 10*3/MM3 (ref 4.8–10.8)

## 2017-09-08 PROCEDURE — 85610 PROTHROMBIN TIME: CPT | Performed by: SURGERY

## 2017-09-08 PROCEDURE — 36415 COLL VENOUS BLD VENIPUNCTURE: CPT | Performed by: SURGERY

## 2017-09-08 PROCEDURE — 85025 COMPLETE CBC W/AUTO DIFF WBC: CPT | Performed by: SURGERY

## 2017-09-08 PROCEDURE — 80053 COMPREHEN METABOLIC PANEL: CPT | Performed by: SURGERY

## 2017-09-08 PROCEDURE — 85730 THROMBOPLASTIN TIME PARTIAL: CPT | Performed by: SURGERY

## 2017-09-08 NOTE — PROGRESS NOTES
Date: 09-08-17    [place]   Information and Education Class for Pre and Post     Surgery Care, Diets and Daily Living.       Surgery Type:   Sleeve Gastrectomy Consent on File    Weight: 363.4lb      Diet Stages Form given including diet stages and surgery dates/times   Picture taken & Picture Consent on File  Weight Loss Surgery Patient Contract on File      Patient has signed/agreed with the Diet Stage & Medication discontinue sheet:       1) Medications have been review by CHANTELLE MEREDITH-there are no other  medications except for the following that this patient will need to discontinue prior to Bariatric Surgery.      2) Patient informed to stop NSAID and Metformin  one week prior to surgery.       3) Dr Bahena recommends that this patient take 2 extra strength Tylenol (1000mg) along with 8 ounces of regular Gatorade (no red or pink in color) the morning of surgery.      4) During Boot camp-Patient also met with Outpatient Surgery Staff regarding Their other medications & prep for surgery.      Patient also received [facility] Surgery Post 1 week  & 1 month follow up appointment.     [unfilled]  9:05 AM

## 2017-09-15 ENCOUNTER — RESULTS ENCOUNTER (OUTPATIENT)
Dept: BARIATRICS/WEIGHT MGMT | Facility: CLINIC | Age: 32
End: 2017-09-15

## 2017-09-15 DIAGNOSIS — E66.01 MORBID OBESITY DUE TO EXCESS CALORIES (HCC): ICD-10-CM

## 2017-09-15 DIAGNOSIS — I10 HYPERTENSION, UNCONTROLLED: ICD-10-CM

## 2017-09-27 ENCOUNTER — ANESTHESIA (OUTPATIENT)
Dept: PERIOP | Facility: HOSPITAL | Age: 32
End: 2017-09-27

## 2017-09-27 ENCOUNTER — ANESTHESIA EVENT (OUTPATIENT)
Dept: PERIOP | Facility: HOSPITAL | Age: 32
End: 2017-09-27

## 2017-09-27 ENCOUNTER — HOSPITAL ENCOUNTER (INPATIENT)
Facility: HOSPITAL | Age: 32
LOS: 1 days | Discharge: HOME | End: 2017-09-28
Attending: SURGERY | Admitting: SURGERY

## 2017-09-27 DIAGNOSIS — E66.9 SUPER OBESE: ICD-10-CM

## 2017-09-27 DIAGNOSIS — Z98.84 STATUS POST LAPAROSCOPIC SLEEVE GASTRECTOMY: Primary | ICD-10-CM

## 2017-09-27 LAB
ABO GROUP BLD: NORMAL
B-HCG UR QL: NEGATIVE
BLD GP AB SCN SERPL QL: NEGATIVE
RH BLD: POSITIVE

## 2017-09-27 PROCEDURE — 94799 UNLISTED PULMONARY SVC/PX: CPT

## 2017-09-27 PROCEDURE — 86901 BLOOD TYPING SEROLOGIC RH(D): CPT | Performed by: SURGERY

## 2017-09-27 PROCEDURE — 0DB64Z3 EXCISION OF STOMACH, PERCUTANEOUS ENDOSCOPIC APPROACH, VERTICAL: ICD-10-PCS | Performed by: SURGERY

## 2017-09-27 PROCEDURE — 25010000002 METOCLOPRAMIDE PER 10 MG: Performed by: SURGERY

## 2017-09-27 PROCEDURE — 88307 TISSUE EXAM BY PATHOLOGIST: CPT | Performed by: SURGERY

## 2017-09-27 PROCEDURE — 81025 URINE PREGNANCY TEST: CPT | Performed by: SURGERY

## 2017-09-27 PROCEDURE — 25010000002 MIDAZOLAM PER 1 MG: Performed by: ANESTHESIOLOGY

## 2017-09-27 PROCEDURE — 25010000002 PROPOFOL 10 MG/ML EMULSION: Performed by: NURSE ANESTHETIST, CERTIFIED REGISTERED

## 2017-09-27 PROCEDURE — 86900 BLOOD TYPING SEROLOGIC ABO: CPT | Performed by: SURGERY

## 2017-09-27 PROCEDURE — 25010000002 ONDANSETRON PER 1 MG: Performed by: NURSE ANESTHETIST, CERTIFIED REGISTERED

## 2017-09-27 PROCEDURE — 25010000002 ONDANSETRON PER 1 MG: Performed by: SURGERY

## 2017-09-27 PROCEDURE — 25010000002 HYDROMORPHONE PER 4 MG: Performed by: ANESTHESIOLOGY

## 2017-09-27 PROCEDURE — 36415 COLL VENOUS BLD VENIPUNCTURE: CPT | Performed by: SURGERY

## 2017-09-27 PROCEDURE — 43775 LAP SLEEVE GASTRECTOMY: CPT | Performed by: SURGERY

## 2017-09-27 PROCEDURE — 25010000002 DEXAMETHASONE PER 1 MG: Performed by: ANESTHESIOLOGY

## 2017-09-27 PROCEDURE — 25010000003 CEFAZOLIN PER 500 MG: Performed by: SURGERY

## 2017-09-27 PROCEDURE — 25010000002 SUCCINYLCHOLINE PER 20 MG: Performed by: NURSE ANESTHETIST, CERTIFIED REGISTERED

## 2017-09-27 PROCEDURE — 25010000002 ENOXAPARIN PER 10 MG: Performed by: SURGERY

## 2017-09-27 PROCEDURE — 25010000002 KETOROLAC TROMETHAMINE PER 15 MG: Performed by: SURGERY

## 2017-09-27 PROCEDURE — 25010000002 DEXAMETHASONE PER 1 MG: Performed by: NURSE ANESTHETIST, CERTIFIED REGISTERED

## 2017-09-27 PROCEDURE — 86850 RBC ANTIBODY SCREEN: CPT | Performed by: SURGERY

## 2017-09-27 DEVICE — MESH STPL LN SEAMGUARD FLX60 BIOABS WHT/BLU/GRN/GLD/BLK: Type: IMPLANTABLE DEVICE | Status: FUNCTIONAL

## 2017-09-27 RX ORDER — LIDOCAINE HYDROCHLORIDE 20 MG/ML
INJECTION, SOLUTION INFILTRATION; PERINEURAL AS NEEDED
Status: DISCONTINUED | OUTPATIENT
Start: 2017-09-27 | End: 2017-09-27 | Stop reason: SURG

## 2017-09-27 RX ORDER — ONDANSETRON 2 MG/ML
INJECTION INTRAMUSCULAR; INTRAVENOUS AS NEEDED
Status: DISCONTINUED | OUTPATIENT
Start: 2017-09-27 | End: 2017-09-27 | Stop reason: SURG

## 2017-09-27 RX ORDER — SUFENTANIL CITRATE 50 UG/ML
INJECTION EPIDURAL; INTRAVENOUS AS NEEDED
Status: DISCONTINUED | OUTPATIENT
Start: 2017-09-27 | End: 2017-09-27 | Stop reason: SURG

## 2017-09-27 RX ORDER — METOCLOPRAMIDE HYDROCHLORIDE 5 MG/ML
10 INJECTION INTRAMUSCULAR; INTRAVENOUS EVERY 6 HOURS SCHEDULED
Status: DISCONTINUED | OUTPATIENT
Start: 2017-09-27 | End: 2017-09-28 | Stop reason: HOSPADM

## 2017-09-27 RX ORDER — MORPHINE SULFATE 2 MG/ML
2 INJECTION, SOLUTION INTRAMUSCULAR; INTRAVENOUS
Status: DISCONTINUED | OUTPATIENT
Start: 2017-09-27 | End: 2017-09-28 | Stop reason: HOSPADM

## 2017-09-27 RX ORDER — LABETALOL HYDROCHLORIDE 5 MG/ML
5 INJECTION, SOLUTION INTRAVENOUS
Status: DISCONTINUED | OUTPATIENT
Start: 2017-09-27 | End: 2017-09-27 | Stop reason: HOSPADM

## 2017-09-27 RX ORDER — SODIUM CHLORIDE 0.9 % (FLUSH) 0.9 %
1-10 SYRINGE (ML) INJECTION AS NEEDED
Status: DISCONTINUED | OUTPATIENT
Start: 2017-09-27 | End: 2017-09-27 | Stop reason: HOSPADM

## 2017-09-27 RX ORDER — METOCLOPRAMIDE HYDROCHLORIDE 5 MG/ML
5 INJECTION INTRAMUSCULAR; INTRAVENOUS
Status: DISCONTINUED | OUTPATIENT
Start: 2017-09-27 | End: 2017-09-27 | Stop reason: HOSPADM

## 2017-09-27 RX ORDER — GABAPENTIN 250 MG/5ML
250 SOLUTION ORAL EVERY 8 HOURS SCHEDULED
Status: DISCONTINUED | OUTPATIENT
Start: 2017-09-27 | End: 2017-09-28 | Stop reason: HOSPADM

## 2017-09-27 RX ORDER — SODIUM CHLORIDE 9 MG/ML
INJECTION, SOLUTION INTRAVENOUS AS NEEDED
Status: DISCONTINUED | OUTPATIENT
Start: 2017-09-27 | End: 2017-09-27 | Stop reason: HOSPADM

## 2017-09-27 RX ORDER — SODIUM CHLORIDE 0.9 % (FLUSH) 0.9 %
3 SYRINGE (ML) INJECTION AS NEEDED
Status: DISCONTINUED | OUTPATIENT
Start: 2017-09-27 | End: 2017-09-27 | Stop reason: HOSPADM

## 2017-09-27 RX ORDER — MIDAZOLAM HYDROCHLORIDE 1 MG/ML
2 INJECTION INTRAMUSCULAR; INTRAVENOUS
Status: DISCONTINUED | OUTPATIENT
Start: 2017-09-27 | End: 2017-09-27 | Stop reason: HOSPADM

## 2017-09-27 RX ORDER — PROPOFOL 10 MG/ML
VIAL (ML) INTRAVENOUS AS NEEDED
Status: DISCONTINUED | OUTPATIENT
Start: 2017-09-27 | End: 2017-09-27 | Stop reason: SURG

## 2017-09-27 RX ORDER — HYDRALAZINE HYDROCHLORIDE 20 MG/ML
10 INJECTION INTRAMUSCULAR; INTRAVENOUS EVERY 4 HOURS PRN
Status: DISCONTINUED | OUTPATIENT
Start: 2017-09-27 | End: 2017-09-28 | Stop reason: HOSPADM

## 2017-09-27 RX ORDER — FAMOTIDINE 10 MG/ML
20 INJECTION, SOLUTION INTRAVENOUS EVERY 12 HOURS SCHEDULED
Status: DISCONTINUED | OUTPATIENT
Start: 2017-09-27 | End: 2017-09-28 | Stop reason: HOSPADM

## 2017-09-27 RX ORDER — MEPERIDINE HYDROCHLORIDE 25 MG/ML
12.5 INJECTION INTRAMUSCULAR; INTRAVENOUS; SUBCUTANEOUS
Status: DISCONTINUED | OUTPATIENT
Start: 2017-09-27 | End: 2017-09-27 | Stop reason: HOSPADM

## 2017-09-27 RX ORDER — DEXAMETHASONE SODIUM PHOSPHATE 4 MG/ML
4 INJECTION, SOLUTION INTRA-ARTICULAR; INTRALESIONAL; INTRAMUSCULAR; INTRAVENOUS; SOFT TISSUE ONCE AS NEEDED
Status: COMPLETED | OUTPATIENT
Start: 2017-09-27 | End: 2017-09-27

## 2017-09-27 RX ORDER — LIDOCAINE HYDROCHLORIDE 40 MG/ML
SOLUTION TOPICAL AS NEEDED
Status: DISCONTINUED | OUTPATIENT
Start: 2017-09-27 | End: 2017-09-27 | Stop reason: SURG

## 2017-09-27 RX ORDER — SODIUM CHLORIDE, SODIUM LACTATE, POTASSIUM CHLORIDE, CALCIUM CHLORIDE 600; 310; 30; 20 MG/100ML; MG/100ML; MG/100ML; MG/100ML
100 INJECTION, SOLUTION INTRAVENOUS CONTINUOUS
Status: DISCONTINUED | OUTPATIENT
Start: 2017-09-27 | End: 2017-09-27 | Stop reason: HOSPADM

## 2017-09-27 RX ORDER — SCOLOPAMINE TRANSDERMAL SYSTEM 1 MG/1
1 PATCH, EXTENDED RELEASE TRANSDERMAL ONCE
Status: DISCONTINUED | OUTPATIENT
Start: 2017-09-27 | End: 2017-09-28 | Stop reason: HOSPADM

## 2017-09-27 RX ORDER — KETOROLAC TROMETHAMINE 30 MG/ML
30 INJECTION, SOLUTION INTRAMUSCULAR; INTRAVENOUS EVERY 6 HOURS PRN
Status: DISCONTINUED | OUTPATIENT
Start: 2017-09-27 | End: 2017-09-28 | Stop reason: HOSPADM

## 2017-09-27 RX ORDER — MIDAZOLAM HYDROCHLORIDE 1 MG/ML
1 INJECTION INTRAMUSCULAR; INTRAVENOUS
Status: DISCONTINUED | OUTPATIENT
Start: 2017-09-27 | End: 2017-09-27 | Stop reason: HOSPADM

## 2017-09-27 RX ORDER — MAGNESIUM HYDROXIDE 1200 MG/15ML
LIQUID ORAL AS NEEDED
Status: DISCONTINUED | OUTPATIENT
Start: 2017-09-27 | End: 2017-09-27 | Stop reason: HOSPADM

## 2017-09-27 RX ORDER — PROMETHAZINE HYDROCHLORIDE 25 MG/ML
12.5 INJECTION, SOLUTION INTRAMUSCULAR; INTRAVENOUS EVERY 6 HOURS PRN
Status: DISCONTINUED | OUTPATIENT
Start: 2017-09-27 | End: 2017-09-28 | Stop reason: HOSPADM

## 2017-09-27 RX ORDER — ROCURONIUM BROMIDE 10 MG/ML
INJECTION, SOLUTION INTRAVENOUS AS NEEDED
Status: DISCONTINUED | OUTPATIENT
Start: 2017-09-27 | End: 2017-09-27 | Stop reason: SURG

## 2017-09-27 RX ORDER — ONDANSETRON 2 MG/ML
4 INJECTION INTRAMUSCULAR; INTRAVENOUS AS NEEDED
Status: DISCONTINUED | OUTPATIENT
Start: 2017-09-27 | End: 2017-09-27 | Stop reason: HOSPADM

## 2017-09-27 RX ORDER — IPRATROPIUM BROMIDE AND ALBUTEROL SULFATE 2.5; .5 MG/3ML; MG/3ML
3 SOLUTION RESPIRATORY (INHALATION) ONCE AS NEEDED
Status: DISCONTINUED | OUTPATIENT
Start: 2017-09-27 | End: 2017-09-27 | Stop reason: HOSPADM

## 2017-09-27 RX ORDER — SODIUM CHLORIDE, SODIUM LACTATE, POTASSIUM CHLORIDE, CALCIUM CHLORIDE 600; 310; 30; 20 MG/100ML; MG/100ML; MG/100ML; MG/100ML
20 INJECTION, SOLUTION INTRAVENOUS CONTINUOUS
Status: DISCONTINUED | OUTPATIENT
Start: 2017-09-27 | End: 2017-09-28 | Stop reason: HOSPADM

## 2017-09-27 RX ORDER — SODIUM CHLORIDE, SODIUM LACTATE, POTASSIUM CHLORIDE, CALCIUM CHLORIDE 600; 310; 30; 20 MG/100ML; MG/100ML; MG/100ML; MG/100ML
1000 INJECTION, SOLUTION INTRAVENOUS CONTINUOUS
Status: DISCONTINUED | OUTPATIENT
Start: 2017-09-27 | End: 2017-09-27 | Stop reason: HOSPADM

## 2017-09-27 RX ORDER — ONDANSETRON 2 MG/ML
4 INJECTION INTRAMUSCULAR; INTRAVENOUS EVERY 6 HOURS SCHEDULED
Status: DISCONTINUED | OUTPATIENT
Start: 2017-09-27 | End: 2017-09-28 | Stop reason: HOSPADM

## 2017-09-27 RX ORDER — SUCCINYLCHOLINE CHLORIDE 20 MG/ML
INJECTION INTRAMUSCULAR; INTRAVENOUS AS NEEDED
Status: DISCONTINUED | OUTPATIENT
Start: 2017-09-27 | End: 2017-09-27 | Stop reason: SURG

## 2017-09-27 RX ORDER — MORPHINE SULFATE 2 MG/ML
2 INJECTION, SOLUTION INTRAMUSCULAR; INTRAVENOUS AS NEEDED
Status: DISCONTINUED | OUTPATIENT
Start: 2017-09-27 | End: 2017-09-27 | Stop reason: HOSPADM

## 2017-09-27 RX ORDER — DEXAMETHASONE SODIUM PHOSPHATE 4 MG/ML
INJECTION, SOLUTION INTRA-ARTICULAR; INTRALESIONAL; INTRAMUSCULAR; INTRAVENOUS; SOFT TISSUE AS NEEDED
Status: DISCONTINUED | OUTPATIENT
Start: 2017-09-27 | End: 2017-09-27 | Stop reason: SURG

## 2017-09-27 RX ORDER — FLUMAZENIL 0.1 MG/ML
0.2 INJECTION INTRAVENOUS AS NEEDED
Status: DISCONTINUED | OUTPATIENT
Start: 2017-09-27 | End: 2017-09-27 | Stop reason: HOSPADM

## 2017-09-27 RX ORDER — NALOXONE HCL 0.4 MG/ML
0.04 VIAL (ML) INJECTION AS NEEDED
Status: DISCONTINUED | OUTPATIENT
Start: 2017-09-27 | End: 2017-09-27 | Stop reason: HOSPADM

## 2017-09-27 RX ORDER — SODIUM CHLORIDE 0.9 % (FLUSH) 0.9 %
1-10 SYRINGE (ML) INJECTION AS NEEDED
Status: DISCONTINUED | OUTPATIENT
Start: 2017-09-27 | End: 2017-09-28 | Stop reason: HOSPADM

## 2017-09-27 RX ORDER — HYDRALAZINE HYDROCHLORIDE 20 MG/ML
5 INJECTION INTRAMUSCULAR; INTRAVENOUS
Status: DISCONTINUED | OUTPATIENT
Start: 2017-09-27 | End: 2017-09-27 | Stop reason: HOSPADM

## 2017-09-27 RX ADMIN — KETOROLAC TROMETHAMINE 30 MG: 30 INJECTION, SOLUTION INTRAMUSCULAR at 20:00

## 2017-09-27 RX ADMIN — SODIUM CHLORIDE, POTASSIUM CHLORIDE, SODIUM LACTATE AND CALCIUM CHLORIDE 140 ML/HR: 600; 310; 30; 20 INJECTION, SOLUTION INTRAVENOUS at 17:08

## 2017-09-27 RX ADMIN — FAMOTIDINE 20 MG: 10 INJECTION, SOLUTION INTRAVENOUS at 20:00

## 2017-09-27 RX ADMIN — METOCLOPRAMIDE 10 MG: 5 INJECTION, SOLUTION INTRAMUSCULAR; INTRAVENOUS at 18:55

## 2017-09-27 RX ADMIN — KETOROLAC TROMETHAMINE 30 MG: 30 INJECTION, SOLUTION INTRAMUSCULAR at 12:43

## 2017-09-27 RX ADMIN — FAMOTIDINE 20 MG: 10 INJECTION, SOLUTION INTRAVENOUS at 14:42

## 2017-09-27 RX ADMIN — HYDROMORPHONE HYDROCHLORIDE 1 MG: 1 INJECTION, SOLUTION INTRAMUSCULAR; INTRAVENOUS; SUBCUTANEOUS at 11:05

## 2017-09-27 RX ADMIN — ONDANSETRON 4 MG: 2 INJECTION INTRAMUSCULAR; INTRAVENOUS at 18:55

## 2017-09-27 RX ADMIN — CEFAZOLIN SODIUM 2 G: 2 SOLUTION INTRAVENOUS at 23:33

## 2017-09-27 RX ADMIN — LIDOCAINE HYDROCHLORIDE 0.5 ML: 10 INJECTION, SOLUTION EPIDURAL; INFILTRATION; INTRACAUDAL; PERINEURAL at 08:03

## 2017-09-27 RX ADMIN — LIDOCAINE HYDROCHLORIDE 0.5 ML: 10 INJECTION, SOLUTION EPIDURAL; INFILTRATION; INTRACAUDAL; PERINEURAL at 07:58

## 2017-09-27 RX ADMIN — SUCCINYLCHOLINE CHLORIDE 200 MG: 20 INJECTION, SOLUTION INTRAMUSCULAR; INTRAVENOUS at 09:30

## 2017-09-27 RX ADMIN — GABAPENTIN 250 MG: 250 SOLUTION ORAL at 22:16

## 2017-09-27 RX ADMIN — CEFAZOLIN 3 G: 1 INJECTION, POWDER, FOR SOLUTION INTRAVENOUS at 09:33

## 2017-09-27 RX ADMIN — LIDOCAINE HYDROCHLORIDE 100 MG: 20 INJECTION, SOLUTION INFILTRATION; PERINEURAL at 09:30

## 2017-09-27 RX ADMIN — ONDANSETRON 4 MG: 2 INJECTION INTRAMUSCULAR; INTRAVENOUS at 23:33

## 2017-09-27 RX ADMIN — LIDOCAINE HYDROCHLORIDE 1 EACH: 40 SOLUTION TOPICAL at 09:30

## 2017-09-27 RX ADMIN — ROCURONIUM BROMIDE 5 MG: 10 INJECTION INTRAVENOUS at 09:30

## 2017-09-27 RX ADMIN — ROCURONIUM BROMIDE 30 MG: 10 INJECTION INTRAVENOUS at 09:38

## 2017-09-27 RX ADMIN — HYDROCODONE BITARTRATE AND ACETAMINOPHEN 10 ML: 7.5; 325 SOLUTION ORAL at 17:08

## 2017-09-27 RX ADMIN — ONDANSETRON 4 MG: 2 INJECTION INTRAMUSCULAR; INTRAVENOUS at 12:43

## 2017-09-27 RX ADMIN — METRONIDAZOLE 500 MG: 500 INJECTION, SOLUTION INTRAVENOUS at 08:49

## 2017-09-27 RX ADMIN — SUGAMMADEX 2 ML: 100 INJECTION, SOLUTION INTRAVENOUS at 10:16

## 2017-09-27 RX ADMIN — SUFENTANIL CITRATE 20 MCG: 50 INJECTION, SOLUTION EPIDURAL; INTRAVENOUS at 09:34

## 2017-09-27 RX ADMIN — MIDAZOLAM HYDROCHLORIDE 2 MG: 1 INJECTION, SOLUTION INTRAMUSCULAR; INTRAVENOUS at 08:59

## 2017-09-27 RX ADMIN — ENOXAPARIN SODIUM 40 MG: 40 INJECTION SUBCUTANEOUS at 08:59

## 2017-09-27 RX ADMIN — SCOLOPAMINE TRANSDERMAL SYSTEM 1 PATCH: 1 PATCH, EXTENDED RELEASE TRANSDERMAL at 08:49

## 2017-09-27 RX ADMIN — HYDROMORPHONE HYDROCHLORIDE 1 MG: 1 INJECTION, SOLUTION INTRAMUSCULAR; INTRAVENOUS; SUBCUTANEOUS at 10:47

## 2017-09-27 RX ADMIN — DEXAMETHASONE SODIUM PHOSPHATE 4 MG: 4 INJECTION, SOLUTION INTRAMUSCULAR; INTRAVENOUS at 09:45

## 2017-09-27 RX ADMIN — METOCLOPRAMIDE 10 MG: 5 INJECTION, SOLUTION INTRAMUSCULAR; INTRAVENOUS at 12:43

## 2017-09-27 RX ADMIN — PROPOFOL 200 MG: 10 INJECTION, EMULSION INTRAVENOUS at 09:30

## 2017-09-27 RX ADMIN — METOCLOPRAMIDE 10 MG: 5 INJECTION, SOLUTION INTRAMUSCULAR; INTRAVENOUS at 23:33

## 2017-09-27 RX ADMIN — CEFAZOLIN SODIUM 2 G: 2 SOLUTION INTRAVENOUS at 17:08

## 2017-09-27 RX ADMIN — DEXAMETHASONE SODIUM PHOSPHATE 4 MG: 4 INJECTION, SOLUTION INTRAMUSCULAR; INTRAVENOUS at 08:59

## 2017-09-27 RX ADMIN — SUFENTANIL CITRATE 10 MCG: 50 INJECTION, SOLUTION EPIDURAL; INTRAVENOUS at 09:36

## 2017-09-27 RX ADMIN — SODIUM CHLORIDE, POTASSIUM CHLORIDE, SODIUM LACTATE AND CALCIUM CHLORIDE 1000 ML: 600; 310; 30; 20 INJECTION, SOLUTION INTRAVENOUS at 07:58

## 2017-09-27 RX ADMIN — ONDANSETRON HYDROCHLORIDE 4 MG: 2 SOLUTION INTRAMUSCULAR; INTRAVENOUS at 09:45

## 2017-09-27 RX ADMIN — METRONIDAZOLE 500 MG: 500 INJECTION, SOLUTION INTRAVENOUS at 09:26

## 2017-09-27 RX ADMIN — GABAPENTIN 250 MG: 250 SOLUTION ORAL at 14:37

## 2017-09-27 RX ADMIN — SODIUM CHLORIDE, POTASSIUM CHLORIDE, SODIUM LACTATE AND CALCIUM CHLORIDE 1000 ML: 600; 310; 30; 20 INJECTION, SOLUTION INTRAVENOUS at 08:03

## 2017-09-27 NOTE — ANESTHESIA POSTPROCEDURE EVALUATION
"Patient: Samantha Vazquez    Procedure Summary     Date Anesthesia Start Anesthesia Stop Room / Location    09/27/17 0925 1035  [facility] OR 04 / [facility] OR       Procedure Diagnosis Surgeon Provider    GASTRIC SLEEVE LAPAROSCOPIC (N/A Abdomen) Body mass index (BMI) of 50-59.9 in adult; Super obese  (Body mass index (BMI) of 50-59.9 in adult [Z68.43]; Super obese [E66.9]) MD Connie Birch CRNA          Anesthesia Type: general  Last vitals  BP   135/69 (09/27/17 1206)    Temp   97.4 °F (36.3 °C) (09/27/17 1206)    Pulse   55 (09/27/17 1206)   Resp   16 (09/27/17 1206)    SpO2   97 % (09/27/17 1206)      Post Anesthesia Care and Evaluation      Comments: Patient discharged from PACU prior to anesthesia evaluation based on Tracy Score.  For details, see RN note.     /69 (BP Location: Right arm, Patient Position: Lying)  Pulse 55  Temp 97.4 °F (36.3 °C) (Oral)   Resp 16  Ht 65.25\" (165.7 cm)  Wt (!) 360 lb 3.2 oz (163 kg)  LMP 09/14/2017  SpO2 97%  BMI 59.48 kg/m2      "

## 2017-09-27 NOTE — ANESTHESIA PREPROCEDURE EVALUATION
Anesthesia Evaluation     Patient summary reviewed   no history of anesthetic complications:  NPO Solid Status: > 8 hours       Airway   Mallampati: III  TM distance: >3 FB  Neck ROM: full  Dental - normal exam         Pulmonary - normal exam   (-) asthma, sleep apnea, not a smoker  Cardiovascular - normal exam  Exercise tolerance: good (4-7 METS)    ECG reviewed    (+) hypertension,   (-) pacemaker, past MI, angina, cardiac stents      Neuro/Psych  (-) seizures, CVA  GI/Hepatic/Renal/Endo    (+) obesity, morbid obesity,   (-) GERD, liver disease, no renal disease, diabetes    Musculoskeletal     (+) back pain,   Abdominal    Substance History      OB/GYN    (-)  Pregnant        Other                                          Anesthesia Plan    ASA 3     general     intravenous induction   Anesthetic plan and risks discussed with patient.

## 2017-09-28 VITALS
HEIGHT: 65 IN | OXYGEN SATURATION: 95 % | RESPIRATION RATE: 16 BRPM | TEMPERATURE: 98.1 F | WEIGHT: 293 LBS | SYSTOLIC BLOOD PRESSURE: 107 MMHG | BODY MASS INDEX: 48.82 KG/M2 | DIASTOLIC BLOOD PRESSURE: 58 MMHG | HEART RATE: 71 BPM

## 2017-09-28 PROBLEM — Z98.84 STATUS POST LAPAROSCOPIC SLEEVE GASTRECTOMY: Status: ACTIVE | Noted: 2017-09-27

## 2017-09-28 LAB
CYTO UR: NORMAL
LAB AP CASE REPORT: NORMAL
Lab: NORMAL
PATH REPORT.FINAL DX SPEC: NORMAL
PATH REPORT.GROSS SPEC: NORMAL

## 2017-09-28 PROCEDURE — 25010000002 ONDANSETRON PER 1 MG: Performed by: SURGERY

## 2017-09-28 PROCEDURE — 25010000002 METOCLOPRAMIDE PER 10 MG: Performed by: SURGERY

## 2017-09-28 PROCEDURE — 25010000002 ENOXAPARIN PER 10 MG: Performed by: SURGERY

## 2017-09-28 PROCEDURE — 25010000002 KETOROLAC TROMETHAMINE PER 15 MG: Performed by: SURGERY

## 2017-09-28 RX ORDER — ONDANSETRON 4 MG/1
4 TABLET, ORALLY DISINTEGRATING ORAL EVERY 8 HOURS PRN
Qty: 30 TABLET | Refills: 0 | Status: SHIPPED | OUTPATIENT
Start: 2017-09-28 | End: 2017-12-28

## 2017-09-28 RX ADMIN — METOCLOPRAMIDE 10 MG: 5 INJECTION, SOLUTION INTRAMUSCULAR; INTRAVENOUS at 05:30

## 2017-09-28 RX ADMIN — SODIUM CHLORIDE, POTASSIUM CHLORIDE, SODIUM LACTATE AND CALCIUM CHLORIDE 140 ML/HR: 600; 310; 30; 20 INJECTION, SOLUTION INTRAVENOUS at 08:47

## 2017-09-28 RX ADMIN — FAMOTIDINE 20 MG: 10 INJECTION, SOLUTION INTRAVENOUS at 08:38

## 2017-09-28 RX ADMIN — KETOROLAC TROMETHAMINE 30 MG: 30 INJECTION, SOLUTION INTRAMUSCULAR at 04:21

## 2017-09-28 RX ADMIN — KETOROLAC TROMETHAMINE 30 MG: 30 INJECTION, SOLUTION INTRAMUSCULAR at 11:41

## 2017-09-28 RX ADMIN — ONDANSETRON 4 MG: 2 INJECTION INTRAMUSCULAR; INTRAVENOUS at 05:30

## 2017-09-28 RX ADMIN — HYDROCODONE BITARTRATE AND ACETAMINOPHEN 10 ML: 7.5; 325 SOLUTION ORAL at 00:37

## 2017-09-28 RX ADMIN — HYDROCODONE BITARTRATE AND ACETAMINOPHEN 10 ML: 7.5; 325 SOLUTION ORAL at 08:29

## 2017-09-28 RX ADMIN — GABAPENTIN 250 MG: 250 SOLUTION ORAL at 05:30

## 2017-09-28 RX ADMIN — ENOXAPARIN SODIUM 40 MG: 40 INJECTION SUBCUTANEOUS at 08:38

## 2017-09-28 RX ADMIN — METOCLOPRAMIDE 10 MG: 5 INJECTION, SOLUTION INTRAMUSCULAR; INTRAVENOUS at 11:43

## 2017-09-28 RX ADMIN — ONDANSETRON 4 MG: 2 INJECTION INTRAMUSCULAR; INTRAVENOUS at 11:43

## 2017-09-29 ENCOUNTER — TELEPHONE (OUTPATIENT)
Dept: BARIATRICS/WEIGHT MGMT | Facility: CLINIC | Age: 32
End: 2017-09-29

## 2017-09-29 NOTE — TELEPHONE ENCOUNTER
Patient is tolerating liquids well with an intake of at 32-40oz and sucking on ice chips. She's ambulating without difficulty. She has some nausea this morning and vomited x1 and then felt better. Her incisions look good. No other concerns were voiced. Patient was instructed to call the office at anytime if she had any concerns.

## 2017-10-02 ENCOUNTER — OFFICE VISIT (OUTPATIENT)
Dept: BARIATRICS/WEIGHT MGMT | Facility: CLINIC | Age: 32
End: 2017-10-02

## 2017-10-02 VITALS
SYSTOLIC BLOOD PRESSURE: 140 MMHG | HEIGHT: 66 IN | OXYGEN SATURATION: 100 % | BODY MASS INDEX: 47.09 KG/M2 | TEMPERATURE: 98.4 F | HEART RATE: 84 BPM | DIASTOLIC BLOOD PRESSURE: 76 MMHG | WEIGHT: 293 LBS

## 2017-10-02 DIAGNOSIS — E66.9 SUPER OBESE: ICD-10-CM

## 2017-10-02 DIAGNOSIS — Z98.84 STATUS POST LAPAROSCOPIC SLEEVE GASTRECTOMY: Primary | ICD-10-CM

## 2017-10-02 PROCEDURE — 99024 POSTOP FOLLOW-UP VISIT: CPT | Performed by: SURGERY

## 2017-10-02 NOTE — PROGRESS NOTES
"Subjective   Samantha Vazquez is a 32 y.o. female.     Samantha is post op one week from a gastric sleeve.  She is currently on her stage II diet.  Patient states she is doing well overall.  Her pain has subsided.  She is been ambulating.  She denies nausea and vomiting.  She has admitted to epigastric burning when she increase her stage II level II.  She has since had not had this problem and she is cautiously increasing her volumes of fluids.    Review Of Systems:  Respiratory ROS: no cough, shortness of breath, or wheezing  Cardiovascular ROS: no chest pain or dyspnea on exertion  Gastrointestinal ROS: no abdominal pain, change in bowel habits, or black or bloody stools  positive for - heartburn  Musculoskeletal ROS: negative    The following portions of the patient's history were reviewed and updated as appropriate: allergies, current medications, past medical history, past surgical history and problem list.    Objective   /76 (BP Location: Left arm, Patient Position: Sitting, Cuff Size: Adult)  Pulse 84  Temp 98.4 °F (36.9 °C)  Ht 66\" (167.6 cm)  Wt (!) 357 lb 12.8 oz (162 kg)  LMP 09/14/2017  SpO2 100%  BMI 57.75 kg/m2    General Appearance:  awake, alert, oriented, in no acute distress  Lungs:  Normal expansion.  Clear to auscultation.  No rales, rhonchi, or wheezing.  Heart:  Heart sounds are normal.  Regular rate and rhythm without murmur, gallop or rub.  Abdomen:  Soft, non-tender, normal bowel sounds; no bruits, organomegaly or masses.  Abnormal shape: obese  Extremities: Extremities warm to touch, pink, with no edema.  Wounds: clean, dry, intact    Assessment/Plan     Encounter Diagnoses   Name Primary?   • Status post laparoscopic sleeve gastrectomy Yes   • Super obese    • Body mass index (BMI) of 50-59.9 in adult      1.  Status post laparoscopic sleeve gastrectomy - The patient and I discussed their weight restrictions which is defined as avoid lifting greater than 15 lbs for at least 4 " weeks to allow healing of her tissue.  I also discussed the avoidance of driving or operating a motor vehicle if she requires, needs taking pain medication, or has a distracting injury or pain because of the risk of injuring herself or others.  She is to follow-up with our program in 3 weeks or as needed.    10/02/17  12:14 PM  Patient Care Team:  VIRI Valente as PCP - General (Family Medicine)  VIRI Maloney as Referring Physician (Family Medicine)  Juan M Garrison MD as Cardiologist (Cardiology)  Home Bahena MD FACS

## 2017-10-13 ENCOUNTER — RESULTS ENCOUNTER (OUTPATIENT)
Dept: BARIATRICS/WEIGHT MGMT | Facility: CLINIC | Age: 32
End: 2017-10-13

## 2017-10-13 DIAGNOSIS — I10 HYPERTENSION, UNCONTROLLED: ICD-10-CM

## 2017-10-13 DIAGNOSIS — E66.01 MORBID OBESITY DUE TO EXCESS CALORIES (HCC): ICD-10-CM

## 2017-10-31 ENCOUNTER — OFFICE VISIT (OUTPATIENT)
Dept: BARIATRICS/WEIGHT MGMT | Facility: CLINIC | Age: 32
End: 2017-10-31

## 2017-10-31 VITALS
WEIGHT: 293 LBS | HEART RATE: 66 BPM | OXYGEN SATURATION: 100 % | BODY MASS INDEX: 47.09 KG/M2 | DIASTOLIC BLOOD PRESSURE: 84 MMHG | HEIGHT: 66 IN | TEMPERATURE: 97.8 F | SYSTOLIC BLOOD PRESSURE: 154 MMHG

## 2017-10-31 DIAGNOSIS — Z98.84 STATUS POST LAPAROSCOPIC SLEEVE GASTRECTOMY: Primary | ICD-10-CM

## 2017-10-31 DIAGNOSIS — E66.01 MORBID OBESITY DUE TO EXCESS CALORIES (HCC): ICD-10-CM

## 2017-10-31 PROCEDURE — 99024 POSTOP FOLLOW-UP VISIT: CPT | Performed by: SURGERY

## 2017-10-31 NOTE — PROGRESS NOTES
"Subjective   Samantha Vazquez is a 32 y.o. female.     Samantha is post op 1 month from her laparoscopic sleeve gastrectomy procedure.  She is currently on her regular diet.  She states she has no complaints, she is tolerating the advancement of her diet without difficulty.  She is consuming approximately 40-60 ounces of fluid daily as well as 30 g of protein daily.    Review Of Systems:  General ROS: negative  Respiratory ROS: no cough, shortness of breath, or wheezing  Cardiovascular ROS: no chest pain or dyspnea on exertion  Gastrointestinal ROS: no abdominal pain, change in bowel habits, or black or bloody stools    The following portions of the patient's history were reviewed and updated as appropriate: allergies, current medications, past medical history, past surgical history and problem list.    Objective   /84 (BP Location: Right arm, Patient Position: Sitting, Cuff Size: Adult)  Pulse 66  Temp 97.8 °F (36.6 °C)  Ht 66\" (167.6 cm)  Wt (!) 343 lb (156 kg)  SpO2 100%  BMI 55.36 kg/m2    General Appearance:  awake, alert, oriented, in no acute distress  Abdomen:  Soft, non-tender, normal bowel sounds; no bruits, organomegaly or masses.  Abnormal shape: obese  Extremities: Extremities warm to touch, pink, with no edema.  Wounds: clean, dry, intact    Assessment/Plan     Encounter Diagnoses   Name Primary?   • Status post laparoscopic sleeve gastrectomy Yes   • Body mass index (BMI) of 50-59.9 in adult    • Morbid obesity due to excess calories      1.  Status post laparoscopic sleeve gastrectomy - the patient and I discussed continuing to consume 4-5 meals a day with portion sizes less than a half a cup.  I have encouraged her to increase her protein intake to approximately 60 g daily.  I stated she is okay to start exercising and increase her physical activity as tolerated without weight restrictions.  She is to gradually increase her exercise as tolerated.  She is to follow-up with our program in " 2 months or as needed.      10/31/17  11:07 AM  Patient Care Team:  VIRI Valente as PCP - General (Family Medicine)  VIRI Maloney as Referring Physician (Family Medicine)  Juan M Garrison MD as Cardiologist (Cardiology)  Home Bahena MD FACS

## 2017-11-10 ENCOUNTER — RESULTS ENCOUNTER (OUTPATIENT)
Dept: BARIATRICS/WEIGHT MGMT | Facility: CLINIC | Age: 32
End: 2017-11-10

## 2017-11-10 DIAGNOSIS — I10 HYPERTENSION, UNCONTROLLED: ICD-10-CM

## 2017-11-10 DIAGNOSIS — E66.01 MORBID OBESITY DUE TO EXCESS CALORIES (HCC): ICD-10-CM

## 2017-12-08 ENCOUNTER — RESULTS ENCOUNTER (OUTPATIENT)
Dept: BARIATRICS/WEIGHT MGMT | Facility: CLINIC | Age: 32
End: 2017-12-08

## 2017-12-08 DIAGNOSIS — E66.01 MORBID OBESITY DUE TO EXCESS CALORIES (HCC): ICD-10-CM

## 2017-12-08 DIAGNOSIS — I10 HYPERTENSION, UNCONTROLLED: ICD-10-CM

## 2017-12-28 ENCOUNTER — OFFICE VISIT (OUTPATIENT)
Dept: BARIATRICS/WEIGHT MGMT | Facility: CLINIC | Age: 32
End: 2017-12-28

## 2017-12-28 VITALS
WEIGHT: 293 LBS | DIASTOLIC BLOOD PRESSURE: 90 MMHG | BODY MASS INDEX: 47.09 KG/M2 | HEART RATE: 66 BPM | TEMPERATURE: 99.1 F | SYSTOLIC BLOOD PRESSURE: 140 MMHG | HEIGHT: 66 IN | OXYGEN SATURATION: 100 %

## 2017-12-28 DIAGNOSIS — Z98.84 STATUS POST LAPAROSCOPIC SLEEVE GASTRECTOMY: Primary | ICD-10-CM

## 2017-12-28 PROCEDURE — 99212 OFFICE O/P EST SF 10 MIN: CPT | Performed by: SURGERY

## 2017-12-28 NOTE — PROGRESS NOTES
"Subjective   Samantha Vazquez is a 32 y.o. female.     Samantha is post op 3 months from her sleeve gastrectomy procedure.  She is currently on her regular diet.  The patient consumes 4 meals daily with portion sizes of less than a half a cup.  She consumes at least 70 ounces of fluid daily as well as 34 g protein daily.  She is exercising 3 times a week for 30 minutes to an hour.  She states she is doing well overall.  She has concerns about a halitosis.    Review Of Systems:  General ROS: negative  Respiratory ROS: no cough, shortness of breath, or wheezing  Cardiovascular ROS: no chest pain or dyspnea on exertion  Gastrointestinal ROS: no abdominal pain, change in bowel habits, or black or bloody stools    The following portions of the patient's history were reviewed and updated as appropriate: allergies, current medications, past medical history, past surgical history and problem list.    Objective   /90 (BP Location: Right arm, Patient Position: Sitting, Cuff Size: Adult)  Pulse 66  Temp 99.1 °F (37.3 °C)  Ht 167.6 cm (66\")  Wt (!) 146 kg (321 lb)  SpO2 100%  BMI 51.81 kg/m2    General Appearance:  awake, alert, oriented, in no acute distress  Abdomen:  Soft, non-tender, normal bowel sounds; no bruits, organomegaly or masses.  Abnormal shape: obese  Extremities: Extremities warm to touch, pink, with no edema.  Wounds: clean, dry, intact    Assessment/Plan     Encounter Diagnoses   Name Primary?   • Status post laparoscopic sleeve gastrectomy Yes   • Body mass index (BMI) of 50-59.9 in adult      1.  Status post sleeve gastrectomy postop 3 months - The patient and I discussed the importance of changing behavior for consistent and successful weight loss.  We first reviewed again the definition of a meal which is defined as portion sizes less than a half a cup and those portions incorporating a protein.  Specifically the protein should fill half of that volume.  I also explained that the patient should " be attempting to consume 4-5 meals as defined above.  This should also be mindful of adequate hydration and incorporation of a regular exercise regimen.  I have also recommended that halitosis could be secondary to oral hygiene.  Due to her not having visited her dentist I recommended that she consider making an appointment.  She is to follow-up with our program in 3 months or as needed.      12/28/17  5:47 PM  Patient Care Team:  VIRI Valente as PCP - General (Family Medicine)  VIRI Maloney as Referring Physician (Family Medicine)  Juan M Garrison MD as Cardiologist (Cardiology)  Home Bahena MD FACS

## 2018-01-05 ENCOUNTER — RESULTS ENCOUNTER (OUTPATIENT)
Dept: BARIATRICS/WEIGHT MGMT | Facility: CLINIC | Age: 33
End: 2018-01-05

## 2018-01-05 DIAGNOSIS — E66.01 MORBID OBESITY DUE TO EXCESS CALORIES (HCC): ICD-10-CM

## 2018-01-05 DIAGNOSIS — I10 HYPERTENSION, UNCONTROLLED: ICD-10-CM

## 2018-02-07 ENCOUNTER — TELEPHONE (OUTPATIENT)
Dept: BARIATRICS/WEIGHT MGMT | Facility: CLINIC | Age: 33
End: 2018-02-07

## 2018-02-07 NOTE — TELEPHONE ENCOUNTER
Patient called stated that she needed a referral to [facility].  Stated that if she got a referral that her insurance would cover for her to be able to go to the gym.     [phone]

## 2018-04-03 ENCOUNTER — TELEPHONE (OUTPATIENT)
Dept: BARIATRICS/WEIGHT MGMT | Facility: CLINIC | Age: 33
End: 2018-04-03

## 2018-04-03 NOTE — TELEPHONE ENCOUNTER
Called spoke with patient as she is needing her S/P [facility] Surg 6 Month Apt.  She cancelled her apt on 03-29-18.     Patient started a new job M-Friday from 8am to 5pm.  She will call back when she has a day off and try to get in for an appointment.

## 2019-03-07 ENCOUNTER — OFFICE VISIT (OUTPATIENT)
Dept: NEUROSURGERY | Facility: CLINIC | Age: 34
End: 2019-03-07

## 2019-03-07 VITALS
SYSTOLIC BLOOD PRESSURE: 112 MMHG | DIASTOLIC BLOOD PRESSURE: 80 MMHG | WEIGHT: 283.4 LBS | HEIGHT: 66 IN | BODY MASS INDEX: 45.55 KG/M2

## 2019-03-07 DIAGNOSIS — G93.2 BENIGN INTRACRANIAL HYPERTENSION: ICD-10-CM

## 2019-03-07 DIAGNOSIS — E23.6 EMPTY SELLA (HCC): ICD-10-CM

## 2019-03-07 DIAGNOSIS — G93.5 CHIARI I MALFORMATION (HCC): Primary | ICD-10-CM

## 2019-03-07 DIAGNOSIS — Z78.9 NON-SMOKER: ICD-10-CM

## 2019-03-07 PROCEDURE — 99204 OFFICE O/P NEW MOD 45 MIN: CPT | Performed by: NURSE PRACTITIONER

## 2019-03-07 RX ORDER — ACETAZOLAMIDE 500 MG/1
500 CAPSULE, EXTENDED RELEASE ORAL 2 TIMES DAILY
Refills: 3 | COMMUNITY
Start: 2019-02-24

## 2019-03-07 RX ORDER — TOPIRAMATE 50 MG/1
50 TABLET, FILM COATED ORAL 2 TIMES DAILY
Refills: 2 | COMMUNITY
Start: 2019-02-24

## 2019-03-07 NOTE — PROGRESS NOTES
Chief complaint:   Chief Complaint   Patient presents with   • Headache     Samantha started with headaches about 5-6 months ago that just would not ease and she finally went to the ER and then had CT scan and after this she has been referred here for consultation.        Subjective     HPI: This is a 33-year-old female patient who was referred to us by Dr. Agnieszka Naranjo for concern for pseudotumor cerebri, empty sella syndrome and Chiari formation.  She comes in today for an evaluation.  The patient states that she normally does not have a lot of headaches however back in October she started having an increasing amount of headaches that she was having and it was not being controlled with over-the-counter Tylenol which does take care of her headaches.  She did have to go to the emergency room for this.  She was seen and evaluated by Dr. Naranjo.  She had a CT scan of her head as well as an MRI of her brain did reveal empty sella and a Chiari type I malformation.  There is also concern about tortuosity of the optic nerve.  She also did have a lumbar puncture performed which did have an opening pressure of 34.  The patient states that there is no stress while having a lumbar puncture performed.  They did remove a total of 24 mL's of CSF.  Patient was started on Topamax and Diamox in December and she says since that time she has not had any return of her headaches and feels that she is doing very well however they wanted her to come for neurosurgical evaluation.    Review of Systems   Genitourinary: Positive for pelvic pain.   Neurological: Positive for light-headedness and headaches.   All other systems reviewed and are negative.       Past Medical History:   Diagnosis Date   • Back pain    • Chest pain on exertion    • Heartburn    • Hypertension     does not currently have any medication   • Insomnia    • Irregular heart beat    • Knee pain    • Migraines    • Night sweats     onset a long time per patient    •  "No energy    • Swollen ankles     occasionally    • Tired    • Urine frequency     Urgency/Frequency   • Weight gain      Past Surgical History:   Procedure Laterality Date   •  SECTION      ,    • ENDOSCOPY N/A 2017    Procedure: ESOPHAGOGASTRODUODENOSCOPY WITH ANESTHESIA;  Surgeon: Home Bahena MD;  Location: [facility] ENDOSCOPY;  Service:    • EXCISION MASS TRUNK      benign   • GASTRIC SLEEVE LAPAROSCOPIC N/A 2017    Procedure: GASTRIC SLEEVE LAPAROSCOPIC;  Surgeon: Home Bahena MD;  Location: [facility] OR;  Service:      Family History   Problem Relation Age of Onset   • Cancer Mother         Breast   • Hypertension Mother    • Cancer Maternal Grandfather    • No Known Problems Brother    • Diabetes Maternal Grandmother      Social History     Tobacco Use   • Smoking status: Never Smoker   • Smokeless tobacco: Never Used   Substance Use Topics   • Alcohol use: No   • Drug use: No       (Not in a hospital admission)  Allergies:  Patient has no known allergies.    Objective      Vital Signs  /80 (BP Location: Right arm, Patient Position: Sitting)   Ht 167.6 cm (66\")   Wt 129 kg (283 lb 6.4 oz)   BMI 45.74 kg/m²     Physical Exam   Constitutional: She is oriented to person, place, and time. She appears well-developed and well-nourished.   HENT:   Head: Normocephalic.   Eyes: Conjunctivae, EOM and lids are normal. Pupils are equal, round, and reactive to light.   Neck: Normal range of motion.   Cardiovascular: Normal rate, regular rhythm and normal heart sounds.   Pulmonary/Chest: Effort normal and breath sounds normal.   Abdominal: Normal appearance.   Musculoskeletal: Normal range of motion.   Neurological: She is alert and oriented to person, place, and time. She has normal strength and normal reflexes. She displays normal reflexes. No cranial nerve deficit or sensory deficit. GCS eye subscore is 4. GCS verbal subscore is 5. GCS motor subscore is 6.   Skin: Skin is warm. "   Psychiatric: She has a normal mood and affect. Her speech is normal and behavior is normal. Thought content normal. Cognition and memory are normal.       Results Review: Dr. Meyer did review the imaging which did reveal the patient does have a Chiari type I malformation as well as empty sella syndrome.  There is also concern of optic nerve  tortuosity.  No significant pressure on the spinal cord no lesions identified.              Assessment/Plan: At this point the patient was discussed with Dr. Meyer.  We are going to send the patient for a venogram of her head and upper neck and involving with her in 3 months to see how she is doing with the medication and to go over the results of the testing.  BMI shows that the patient is overweight.  BMI chart was given the patient.  She is a non-smoker.    Samantha was seen today for headache.    Diagnoses and all orders for this visit:    Chiari I malformation (CMS/HCC)  -     CT venogram head w contrast; Future  -     CT angiogram neck w wo contrast; Future    Empty sella (CMS/HCC)  -     CT venogram head w contrast; Future  -     CT angiogram neck w wo contrast; Future    BMI 45.0-49.9, adult (CMS/HCC)  -     CT venogram head w contrast; Future  -     CT angiogram neck w wo contrast; Future    Non-smoker    Benign intracranial hypertension          I discussed the patients findings and my recommendations with patient    Suhail Suero, APRN  03/07/19  1:32 PM

## 2019-03-07 NOTE — PATIENT INSTRUCTIONS

## 2019-03-10 ENCOUNTER — OUTSIDE FACILITY SERVICE (OUTPATIENT)
Dept: CARDIOLOGY | Facility: CLINIC | Age: 34
End: 2019-03-10

## 2019-03-10 PROCEDURE — 93010 ELECTROCARDIOGRAM REPORT: CPT | Performed by: INTERNAL MEDICINE

## 2019-04-01 ENCOUNTER — HOSPITAL ENCOUNTER (OUTPATIENT)
Dept: CT IMAGING | Facility: HOSPITAL | Age: 34
Discharge: HOME | End: 2019-04-01
Admitting: NURSE PRACTITIONER

## 2019-04-01 DIAGNOSIS — G93.5 CHIARI I MALFORMATION (HCC): ICD-10-CM

## 2019-04-01 DIAGNOSIS — E23.6 EMPTY SELLA (HCC): ICD-10-CM

## 2019-04-01 LAB — CREAT BLDA-MCNC: 0.7 MG/DL (ref 0.6–1.3)

## 2019-04-01 PROCEDURE — 0 IOPAMIDOL PER 1 ML: Performed by: NURSE PRACTITIONER

## 2019-04-01 PROCEDURE — 70498 CT ANGIOGRAPHY NECK: CPT

## 2019-04-01 PROCEDURE — 82565 ASSAY OF CREATININE: CPT

## 2019-04-01 RX ADMIN — IOPAMIDOL 100 ML: 755 INJECTION, SOLUTION INTRAVENOUS at 10:00

## 2019-04-08 ENCOUNTER — APPOINTMENT (OUTPATIENT)
Dept: CT IMAGING | Facility: HOSPITAL | Age: 34
End: 2019-04-08

## 2019-04-11 ENCOUNTER — HOSPITAL ENCOUNTER (OUTPATIENT)
Dept: CT IMAGING | Facility: HOSPITAL | Age: 34
Discharge: HOME | End: 2019-04-11
Admitting: NURSE PRACTITIONER

## 2019-04-11 DIAGNOSIS — G93.5 CHIARI I MALFORMATION (HCC): ICD-10-CM

## 2019-04-11 DIAGNOSIS — E23.6 EMPTY SELLA (HCC): ICD-10-CM

## 2019-04-11 PROCEDURE — 70496 CT ANGIOGRAPHY HEAD: CPT

## 2019-04-11 PROCEDURE — 0 IOPAMIDOL PER 1 ML: Performed by: NURSE PRACTITIONER

## 2019-04-11 RX ADMIN — IOPAMIDOL 150 ML: 755 INJECTION, SOLUTION INTRAVENOUS at 10:28

## 2019-07-10 ENCOUNTER — TELEPHONE (OUTPATIENT)
Dept: NEUROSURGERY | Facility: CLINIC | Age: 34
End: 2019-07-10

## 2019-07-10 NOTE — TELEPHONE ENCOUNTER
Patient called me back after hearing my message from her friend and she stated she had no idea she had an appt tomorrow.  Since she wasn't aware of it she has other things going on and cannot make it tomorrow.  She is doing okay and requested the appt be rescheduled.  I did inform her that it would be October and she was okay with that.    New appt made for 10/29/19 at 12:30 pm      brian swain CMA

## 2019-07-10 NOTE — TELEPHONE ENCOUNTER
Called to confirm patient's appt for tomorrow but she didn't answer and her voicemail is full.  brianjamey swain [facility]    Patient's mom's # is no longer in service.  brian wiliam [facility]    Patient's EC, Taina Moreland, took my direct # and is going to text the patient and have her contact me about her appt tomorrow.  brian swain [facility]

## 2019-09-16 ENCOUNTER — TELEPHONE (OUTPATIENT)
Dept: BARIATRICS/WEIGHT MGMT | Facility: CLINIC | Age: 34
End: 2019-09-16

## 2019-09-16 NOTE — TELEPHONE ENCOUNTER
Patient called stating she wanted an appointment with Dr. Bahena to discuss revision. I told her that Dr. Bahena does not do revision but she still would like to come in and talk.

## 2019-10-28 ENCOUNTER — TELEPHONE (OUTPATIENT)
Dept: NEUROSURGERY | Facility: CLINIC | Age: 34
End: 2019-10-28

## 2019-10-28 NOTE — TELEPHONE ENCOUNTER
Left voicemail for patient to call back to confirm her appt with Dr Meyer tomorrow    brian swain CMA

## 2020-01-29 ENCOUNTER — TELEPHONE (OUTPATIENT)
Dept: NEUROSURGERY | Facility: CLINIC | Age: 35
End: 2020-01-29

## 2020-01-29 NOTE — TELEPHONE ENCOUNTER
Left message asking patient to call me back to confirm her appt with Dr Meyer tomorrow @ 2:15 pm    Called patient's EC/mother, Lauren, but when the woman answers she says this is not the patient's mother's number.      brian swain CMA

## (undated) DEVICE — SUT MNCRYL 4/0 RB1 27IN UD MCP214H

## (undated) DEVICE — ENDOGATOR AUXILIARY WATER JET CONNECTOR: Brand: ENDOGATOR

## (undated) DEVICE — GLV SURG BIOGEL LTX PF 8

## (undated) DEVICE — FRCP BX RADJAW4 NDL 2.8 240 STD OG

## (undated) DEVICE — THE CHANNEL CLEANING BRUSH IS A NYLON FLEXI BRUSH ATTACHED TO A FLEXIBLE PLASTIC SHEATH DESIGNED TO SAFELY REMOVE DEBRIS FROM FLEXIBLE ENDOSCOPES.

## (undated) DEVICE — SENSR O2 OXIMAX FNGR A/ 18IN NONSTR

## (undated) DEVICE — ENDOPATH XCEL WITH OPTIVIEW TECHNOLOGY UNIVERSAL TROCAR STABILITY SLEEVES: Brand: ENDOPATH XCEL OPTIVIEW

## (undated) DEVICE — ENDOPATH XCEL BLADELESS TROCARS WITH STABILITY SLEEVES: Brand: ENDOPATH XCEL

## (undated) DEVICE — 2, DISPOSABLE SUCTION/IRRIGATOR WITHOUT DISPOSABLE TIP: Brand: STRYKEFLOW

## (undated) DEVICE — CONMED SCOPE SAVER BITE BLOCK, 20X27 MM: Brand: SCOPE SAVER

## (undated) DEVICE — Device: Brand: DEFENDO AIR/WATER/SUCTION AND BIOPSY VALVE

## (undated) DEVICE — SYR LL TP 10ML STRL

## (undated) DEVICE — PK TURNOVER RM ADV

## (undated) DEVICE — APPL CHLORAPREP W/TINT 26ML ORNG

## (undated) DEVICE — VISIGI 3D®  CALIBRATION SYSTEM  SIZE 40FR STD W/ BULB: Brand: BOEHRINGER® VISIGI 3D™ SLEEVE GASTRECTOMY CALIBRATION SYSTEM, SIZE 40FR W/BULB

## (undated) DEVICE — VISUALIZATION SYSTEM: Brand: CLEARIFY

## (undated) DEVICE — CARTRDG SUT PROXISURETM VIC COAT SZ2/0

## (undated) DEVICE — NDL HYPO PRECISIONGLIDE REG 22G 1 1/2

## (undated) DEVICE — BANDAGE,GAUZE,BULKEE II,4.5"X4.1YD,STRL: Brand: MEDLINE

## (undated) DEVICE — GOWN,NON-REINFORCED,SIRUS,SET IN SLV,XXL: Brand: MEDLINE

## (undated) DEVICE — MAT PREVALON MOBL TRANSFR AIR W/PAD 39X80IN

## (undated) DEVICE — TBG SMPL FLTR LINE NASL 02/C02 A/ BX/100

## (undated) DEVICE — CUFF,BP,DISP,1 TUBE,ADULT,HP: Brand: MEDLINE

## (undated) DEVICE — SYS CLOSE PORTII CARTR/THOMASN XL

## (undated) DEVICE — ENDOPATH XCEL WITH OPTIVIEW TECHNOLOGY BLADELESS TROCARS WITH STABILITY SLEEVES: Brand: ENDOPATH XCEL OPTIVIEW

## (undated) DEVICE — FLTR PLUMEPORT LAP W/CONN STRL

## (undated) DEVICE — PAD LAP CHOLE: Brand: MEDLINE INDUSTRIES, INC.

## (undated) DEVICE — HARMONIC ACE +7 LAPAROSCOPIC SHEARS ADVANCED HEMOSTASIS 5MM DIAMETER 45CM SHAFT LENGTH  FOR USE WITH GRAY HAND PIECE ONLY: Brand: HARMONIC ACE

## (undated) DEVICE — MINI ENDOCUT SCISSOR TIP, DISPOSABLE: Brand: RENEW

## (undated) DEVICE — GLV SURG TRIUMPH ORTHO W/ALOE PF LTX 7.0

## (undated) DEVICE — GLV SURG TRIUMPH GREEN W/ALOE PF LTX 8.5 STRL

## (undated) DEVICE — ECHELON FLEX POWERED PLUS LONG ARTICULATING ENDOSCOPIC LINEAR CUTTER, 60MM: Brand: ECHELON FLEX

## (undated) DEVICE — GOWN,NON-REINFORCED,SIRUS,SET IN SLV,XL: Brand: MEDLINE

## (undated) DEVICE — SKIN AFFIX SURG ADHESIVE 72/CS 0.55ML: Brand: MEDLINE

## (undated) DEVICE — SUT VIC 0 SUTUPAK TIES 18IN J906G

## (undated) DEVICE — BNDR ABD 4PANEL 12IN 63 TO 74IN

## (undated) DEVICE — TOWEL,OR,DSP,ST,BLUE,DLX,10/PK,8PK/CS: Brand: MEDLINE